# Patient Record
Sex: FEMALE | Race: WHITE | NOT HISPANIC OR LATINO | Employment: OTHER | URBAN - NONMETROPOLITAN AREA
[De-identification: names, ages, dates, MRNs, and addresses within clinical notes are randomized per-mention and may not be internally consistent; named-entity substitution may affect disease eponyms.]

---

## 2017-03-02 ENCOUNTER — HISTORY (OUTPATIENT)
Dept: RADIOLOGY | Facility: OTHER | Age: 62
End: 2017-03-02

## 2017-03-02 ENCOUNTER — HOSPITAL ENCOUNTER (OUTPATIENT)
Dept: RADIOLOGY | Facility: OTHER | Age: 62
End: 2017-03-02
Attending: FAMILY MEDICINE

## 2017-03-02 DIAGNOSIS — Z12.31 ENCOUNTER FOR SCREENING MAMMOGRAM FOR MALIGNANT NEOPLASM OF BREAST: ICD-10-CM

## 2017-07-12 ENCOUNTER — OFFICE VISIT (OUTPATIENT)
Dept: OTOLARYNGOLOGY | Facility: CLINIC | Age: 62
End: 2017-07-12
Payer: COMMERCIAL

## 2017-07-12 DIAGNOSIS — G51.9 FACIAL NERVE DISORDER: Primary | ICD-10-CM

## 2017-07-12 PROCEDURE — 64612 DESTROY NERVE FACE MUSCLE: CPT | Performed by: OTOLARYNGOLOGY

## 2017-07-12 NOTE — MR AVS SNAPSHOT
After Visit Summary   7/12/2017    Zakiya Hopkins    MRN: [id number]           Patient Information     Date Of Birth          1955        Visit Information        Provider Department      7/12/2017 1:30 PM Gilmer Metcalf MD [facility]        Today's Diagnoses     Facial nerve disorder    -  1      Care Instructions    Please contact our clinic if you have questions or if problems arise:    [place] office: [phone]  [facility] office: [phone]    If it is an urgent matter when the clinic is closed, please contact the [facility]  [phone] and ask to have Dr. Gilmer Metcalf, or the ENT resident on-call paged. If it is a serious matter requiring immediate attention, please call 911 or go to your nearest emergency department.            Follow-ups after your visit        Follow-up notes from your care team     Return if symptoms worsen or fail to improve.      Who to contact     If you have questions or need follow up information about today's clinic visit or your schedule please contact [facility] directly at [phone].  Normal or non-critical lab and imaging results will be communicated to you by MyChart, letter or phone within 4 business days after the clinic has received the results. If you do not hear from us within 7 days, please contact the clinic through Bramasolhart or phone. If you have a critical or abnormal lab result, we will notify you by phone as soon as possible.  Submit refill requests through 2CRisk or call your pharmacy and they will forward the refill request to us. Please allow 3 business days for your refill to be completed.          Additional Information About Your Visit        Bramasolhart Information     2CRisk is an electronic gateway that provides easy, online access to your medical records. With 2CRisk, you can request a clinic appointment, read your test results, renew a prescription or  communicate with your care team.     To sign up for ByteLighthart visit the website at [email or web address]   You will be asked to enter the access code listed below, as well as some personal information. Please follow the directions to create your username and password.     Your access code is: [email or web address]  Expires: 10/10/2017  1:44 PM     Your access code will  in 90 days. If you need help or a new code, please contact your [facility] or call [phone] for assistance.        Care EveryWhere ID     This is your Care EveryWhere ID. This could be used by other organizations to access your [place] medical records  [id number]         Blood Pressure from Last 3 Encounters:   12/10/15 129/74   10/29/15 127/56   13 126/66    Weight from Last 3 Encounters:   10/28/15 60.4 kg (133 lb 2.5 oz)   12 59 kg (130 lb)   12 59 kg (130 lb)              We Performed the Following     BOTOX 1-25 UNITS     CHEMODENERVATION, FACE NERVE MUSCLE        Primary Care Provider Office Phone # Fax #    Armani VERA Chao [phone]       [facility] [address] [facility] [address]        Equal Access to Services     ENEDINA JOHNS : Hadii aad ku hadasho Soomaali, waaxda luqadaha, qaybta kaalmada adeegyada, waxay idiin hayaan faby silva . So [facility] [phone].    ATENCIÓN: Si habla español, tiene a coughlin disposición servicios gratuitos de asistencia lingüística. Aricame al [phone].    We comply with applicable federal civil rights laws and [place] laws. We do not discriminate on the basis of race, color, national origin, age, disability sex, sexual orientation or gender identity.            Thank you!     Thank you for choosing [facility]  for your care. Our goal is always to provide you with excellent care. Hearing back from our patients is one way we can continue to improve our services. Please take a few minutes to  complete the written survey that you may receive in the mail after your visit with us. Thank you!             Your Updated Medication List - Protect others around you: Learn how to safely use, store and throw away your medicines at www.disposemymeds.org.          This list is accurate as of: 7/12/17  1:44 PM.  Always use your most recent med list.                   Brand Name Dispense Instructions for use Diagnosis    diphenhydrAMINE-acetaminophen  MG tablet    TYLENOL PM     Take 1 tablet by mouth nightly as needed for sleep        REFRESH P.M. OP      Apply  to eye.        TURMERIC PO           vitamin D 1000 UNITS capsule      Take 1 capsule by mouth daily

## 2017-07-12 NOTE — PATIENT INSTRUCTIONS
Please contact our clinic if you have questions or if problems arise:    [place] office: [phone]  [facility] office: [phone]    If it is an urgent matter when the clinic is closed, please contact the [facility]  [phone] and ask to have Dr. Gilmer Metcalf, or the ENT resident on-call paged. If it is a serious matter requiring immediate attention, please call 911 or go to your nearest emergency department.

## 2017-07-12 NOTE — PROGRESS NOTES
CLINICAL SUMMARY:   Diagnoses:    1) Facial Paralysis following vestibular schwannoma.  -7/30/12: Right middle fossa approach to excision of right acoustic neuroma with Jesús Altman and Lambert.   -8/20/12: Right upper eyelid platinum weight implant- 1.8g.   -10/29/15: removal of upper eyelid weight.    2) Right periorbital and chin synkinesis.  -Botox: 1/27/16, 6/29/16.  Comorbidities: None    Pertinent medications: None    Photographs: [facility] consents signed September 10, 2015.   Connection: N/A   Care Checklist:   _follow up phone call with the nursing staff in 2-3 weeks.      She reports that she does not notice the twitching of her right eye and requests no botox to that area at this time. The dragging spasm and pulling of her lower jawline has returned and is worse at the end of the day. This has been greatly helped in the past by the botox injections. . The synkinesis is associated with tightness. She has also noticed the chin dimpling has returned. I recommend botox treatment at our previous dosage without the periorbital component. No side effects from the last botox treatment. I offered injection to her platysma but she wanted to try the chin injections only since they have worked so well in the past.         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     Postoperative diagnosis: same.     Procedure:  Botox injection to the right chin, 15u total.  -Right chin 5u x 3     Botox informaton: Allergan Onabotulinumtoxin A 100u/vial. Lot#[id number], Expiration 12/2019. NDC 5148-5907-41     A preoperative discussion was held. The Ms. Hopkins stated she understood the risks, benefits, alternatives, and limitations of the procedure. The risks, including but not limited to, bleeding, discomfort at the injection site, headache, neck pain, dry mouth, tiredness, loss of strength in the body, hoarseness, trouble saying words, trouble breathing, trouble swallowing, eye  problems such as double vision, blurred vision, decreased eyesight, drooping eyelids, swelling of eyelids, dry eyes, and unforseen complications related to the procedure were discussed. I emphasized the possibility of worsening of her eye function given her history of facial paralysis and eye dryness. I also emphasized the temporary nature of the treatment and more treatment may be necessary in the future. She stated that her questions were answered to her satisfaction. She wished to undergo the procedure.     Procedure: After informed consent was obtained, the patient was prepped with an alcohol pad and marked. Reconstituted botox 100u with a 2mL dilution of unpreserved saline was used during the procedure.    15u total was injected at each of the following sites:    -Right chin 5u x 3.  Hemostasis was excellent. The patient tolerated the procedure well and was discharged home in stable condition. She was instructed to avoid rubbing the injection site for at least 36 hours, keep her head elevated for 6 hours, minimize facial movement, and to call if they have questions or if problems arise..

## 2017-07-12 NOTE — NURSING NOTE
"Zakiya Hopkins's goals for this visit include:   Chief Complaint   Patient presents with     Botox     botox, facial for synkinesis       She requests these members of her care team be copied on today's visit information: Armani Guidry      PCP: Armani Guidry    Referring Provider:  No referring provider defined for this encounter.    Chief Complaint   Patient presents with     Botox     botox, facial for synkinesis       Initial There were no vitals taken for this visit. Estimated body mass index is 21.49 kg/(m^2) as calculated from the following:    Height as of 10/28/15: 1.676 m (5' 6\").    Weight as of 10/28/15: 60.4 kg (133 lb 2.5 oz).  Medication Reconciliation: complete    Do you need any medication refills at today's visit? No    Chichi Ramirez RN    "

## 2017-11-20 ENCOUNTER — AMBULATORY - GICH (OUTPATIENT)
Dept: SCHEDULING | Facility: OTHER | Age: 62
End: 2017-11-20

## 2017-12-13 ENCOUNTER — HISTORY (OUTPATIENT)
Dept: FAMILY MEDICINE | Facility: OTHER | Age: 62
End: 2017-12-13

## 2017-12-13 ENCOUNTER — OFFICE VISIT - GICH (OUTPATIENT)
Dept: FAMILY MEDICINE | Facility: OTHER | Age: 62
End: 2017-12-13

## 2017-12-13 ENCOUNTER — AMBULATORY - GICH (OUTPATIENT)
Dept: FAMILY MEDICINE | Facility: OTHER | Age: 62
End: 2017-12-13

## 2017-12-13 ENCOUNTER — COMMUNICATION - GICH (OUTPATIENT)
Dept: FAMILY MEDICINE | Facility: OTHER | Age: 62
End: 2017-12-13

## 2017-12-13 DIAGNOSIS — G51.0 BELL'S PALSY: ICD-10-CM

## 2017-12-13 DIAGNOSIS — H81.4 VERTIGO OF CENTRAL ORIGIN: ICD-10-CM

## 2017-12-13 DIAGNOSIS — E78.5 HYPERLIPIDEMIA: ICD-10-CM

## 2017-12-13 DIAGNOSIS — M12.9 ARTHROPATHY: ICD-10-CM

## 2017-12-13 DIAGNOSIS — G47.00 INSOMNIA: ICD-10-CM

## 2017-12-13 DIAGNOSIS — D33.3 BENIGN NEOPLASM OF CRANIAL NERVE (H): ICD-10-CM

## 2017-12-13 LAB
A/G RATIO - HISTORICAL: 1.6 (ref 1–2)
ABSOLUTE BASOPHILS - HISTORICAL: 0 THOU/CU MM
ABSOLUTE EOSINOPHILS - HISTORICAL: 0.1 THOU/CU MM
ABSOLUTE IMMATURE GRANULOCYTES(METAS,MYELOS,PROS) - HISTORICAL: 0 THOU/CU MM
ABSOLUTE LYMPHOCYTES - HISTORICAL: 1.5 THOU/CU MM (ref 0.9–2.9)
ABSOLUTE MONOCYTES - HISTORICAL: 0.4 THOU/CU MM
ABSOLUTE NEUTROPHILS - HISTORICAL: 3.3 THOU/CU MM (ref 1.7–7)
ALBUMIN SERPL-MCNC: 4.4 G/DL (ref 3.5–5.7)
ALP SERPL-CCNC: 78 IU/L (ref 34–104)
ALT (SGPT) - HISTORICAL: 20 IU/L (ref 7–52)
ANION GAP - HISTORICAL: 9 (ref 5–18)
AST SERPL-CCNC: 20 IU/L (ref 13–39)
BASOPHILS # BLD AUTO: 0.6 %
BILIRUB SERPL-MCNC: 1.2 MG/DL (ref 0.3–1)
BUN SERPL-MCNC: 17 MG/DL (ref 7–25)
BUN/CREAT RATIO - HISTORICAL: 23
CALCIUM SERPL-MCNC: 9.5 MG/DL (ref 8.6–10.3)
CHLORIDE SERPLBLD-SCNC: 105 MMOL/L (ref 98–107)
CHOL/HDL RATIO - HISTORICAL: 2.72
CHOLESTEROL TOTAL: 223 MG/DL
CO2 SERPL-SCNC: 26 MMOL/L (ref 21–31)
CREAT SERPL-MCNC: 0.74 MG/DL (ref 0.7–1.3)
EOSINOPHIL NFR BLD AUTO: 1.3 %
ERYTHROCYTE [DISTWIDTH] IN BLOOD BY AUTOMATED COUNT: 12.7 % (ref 11.5–15.5)
GFR IF NOT AFRICAN AMERICAN - HISTORICAL: >60 ML/MIN/1.73M2
GLOBULIN - HISTORICAL: 2.7 G/DL (ref 2–3.7)
GLUCOSE SERPL-MCNC: 100 MG/DL (ref 70–105)
HCT VFR BLD AUTO: 44.5 % (ref 33–51)
HDLC SERPL-MCNC: 82 MG/DL (ref 23–92)
HEMOGLOBIN: 14.6 G/DL (ref 12–16)
IMMATURE GRANULOCYTES(METAS,MYELOS,PROS) - HISTORICAL: 0.2 %
LDLC SERPL CALC-MCNC: 128 MG/DL
LYMPHOCYTES NFR BLD AUTO: 27.9 % (ref 20–44)
MCH RBC QN AUTO: 29 PG (ref 26–34)
MCHC RBC AUTO-ENTMCNC: 32.8 G/DL (ref 32–36)
MCV RBC AUTO: 88 FL (ref 80–100)
MONOCYTES NFR BLD AUTO: 8.3 %
NEUTROPHILS NFR BLD AUTO: 61.7 % (ref 42–72)
NON-HDL CHOLESTEROL - HISTORICAL: 141 MG/DL
PLATELET # BLD AUTO: 238 THOU/CU MM (ref 140–440)
PMV BLD: 10.2 FL (ref 6.5–11)
POTASSIUM SERPL-SCNC: 4.2 MMOL/L (ref 3.5–5.1)
PROT SERPL-MCNC: 7.1 G/DL (ref 6.4–8.9)
PROVIDER ORDERDED STATUS - HISTORICAL: ABNORMAL
RED BLOOD COUNT - HISTORICAL: 5.04 MIL/CU MM (ref 4–5.2)
SODIUM SERPL-SCNC: 140 MMOL/L (ref 133–143)
TRIGL SERPL-MCNC: 65 MG/DL
WHITE BLOOD COUNT - HISTORICAL: 5.3 THOU/CU MM (ref 4.5–11)

## 2017-12-17 ENCOUNTER — HEALTH MAINTENANCE LETTER (OUTPATIENT)
Age: 62
End: 2017-12-17

## 2018-01-03 NOTE — PROGRESS NOTES
Patient Information     Patient Name MRN Sex Zakiya Goldberg [id number] Female 1955      Progress Notes by Ann Jeffries R.T. (ARRT) at 3/2/2017 10:28 AM     Author:  Ann Jeffries R.T. (ARRT) Service:  (none) Author Type:  (none)     Filed:  3/2/2017 10:28 AM Date of Service:  3/2/2017 10:28 AM Status:  Signed     :  Ann Jeffries R.T. (SPENCERT) ([facility] - Registered Radiologic Technologist)            Falls Risk Criteria:    Age 65 and older or under age 4        Sensory deficits    Poor vision    Use of ambulatory aides    Impaired judgment    Unable to walk independently    Meets High Risk criteria for falls:  no

## 2018-01-26 ENCOUNTER — DOCUMENTATION ONLY (OUTPATIENT)
Dept: FAMILY MEDICINE | Facility: OTHER | Age: 63
End: 2018-01-26

## 2018-01-26 PROBLEM — G57.60 LESION OF PLANTAR NERVE: Status: ACTIVE | Noted: 2018-01-26

## 2018-01-26 RX ORDER — ZOLPIDEM TARTRATE 10 MG/1
10 TABLET ORAL
COMMUNITY
Start: 2017-12-13 | End: 2018-07-02

## 2018-01-26 RX ORDER — ZOLPIDEM TARTRATE 5 MG/1
1 TABLET ORAL AT BEDTIME
COMMUNITY
End: 2019-02-12 | Stop reason: DRUGHIGH

## 2018-01-26 RX ORDER — OXYCODONE HYDROCHLORIDE 5 MG/1
1 CAPSULE ORAL EVERY 6 HOURS
COMMUNITY
End: 2019-02-12

## 2018-01-26 RX ORDER — CEPHALEXIN 500 MG/1
1 CAPSULE ORAL 4 TIMES DAILY
COMMUNITY
Start: 2012-08-20 | End: 2018-03-06

## 2018-01-26 RX ORDER — TURMERIC 100 %
POWDER (GRAM) MISCELLANEOUS
COMMUNITY
End: 2019-02-12

## 2018-01-26 RX ORDER — ALCONOX
1 POWDER (GRAM) MISCELLANEOUS DAILY
COMMUNITY
End: 2019-05-20

## 2018-01-26 RX ORDER — ERYTHROMYCIN 5 MG/G
OINTMENT OPHTHALMIC
COMMUNITY
Start: 2012-08-20 | End: 2019-02-12

## 2018-02-09 VITALS
DIASTOLIC BLOOD PRESSURE: 88 MMHG | BODY MASS INDEX: 21.21 KG/M2 | SYSTOLIC BLOOD PRESSURE: 146 MMHG | HEART RATE: 68 BPM | HEIGHT: 66 IN | WEIGHT: 132 LBS

## 2018-02-10 ASSESSMENT — PATIENT HEALTH QUESTIONNAIRE - PHQ9: SUM OF ALL RESPONSES TO PHQ QUESTIONS 1-9: 3

## 2018-02-12 NOTE — PROGRESS NOTES
Patient Information     Patient Name MRN Sex Zakiya Goldberg [id number] Female 1955      Progress Notes by Armani Guidry MD at 2017  8:15 AM     Author:  Armani Guidry MD Service:  (none) Author Type:  Physician     Filed:  2017  2:21 PM Encounter Date:  2017 Status:  Signed     :  Armani Guidry MD (Physician)            There are no exam notes on file for this visit.    SUBJECTIVE:  Zakiya Hopkins  is a 62 y.o. female who comes in today for complete evaluation.    She didn't tolerate Remeron. Ambien works    She recently did FIT test through her insurance which was negative. Colonoscopy last done in . She last had lipids done 5 years ago. She is up-to-date on immunizations but has not yet had her flu shot.    She has had facial synkinesis after facial paralysis from her surgery and has had Botox in her chin, but didn't have the periorbital area done. She has no pain, but feels like her right face feels tight and pulling. She at times will get some twitching. She will at times get clear fluid out of her nose when she bends over. It has a funny taste/smell. She has no systemic symptoms.     Neymar had prostatectomy and was doing well, but fell and tore his rotator cuff and had a repair and broke his toe so he is miserable.     She has been going to the gym 3 times per week.  They are enjoying living in her new home. She is still antiquing.      Past Medical, Family, and Social History reviewed and updated as noted below.   ROS is negative except as noted above       Allergies      Allergen   Reactions     Demerol [Meperidine]  Vomiting     Dilaudid [Hydromorphone]  Vomiting     Erythromycin  Edema     ointment    ,   Family History       Problem   Relation Age of Onset     Other  Mother      Chemical dependency/Alzheimers       Cancer-breast  Sister      Other  Sister      Bipolar       Hypertension  Father      Diabetes  Father      Other  Father      Chemical  "depedency       Stroke  Father      Heart Disease  Father      angioplasty       Cancer-breast  Maternal Grandmother      Heart Disease  Maternal Grandmother      CAD       Alcohol/Drug  Brother      Alcoholic     ,   Current Outpatient Prescriptions on File Prior to Visit       Medication  Sig Dispense Refill     cholecalciferol (VITAMIN D) 1,000 unit capsule Take 2 capsules daily  0     diphenhydrAMINE-acetaminophen  mg (ACETAMINOPHEN PM)  mg tablet Take 1 tablet by mouth at bedtime. Max acetaminophen dose: 4000mg in 24 hrs.  0     No current facility-administered medications on file prior to visit.    ,   Past Medical History:     Diagnosis  Date     Facial nerve palsy, secondary 8/12    Postoperative complication after acoustic neuroma resection      Menarche age 11     Tendinitis of left rotator cuff      Tortuous colon 04/05   ,   Patient Active Problem List      Diagnosis Date Noted     Insomnia 10/20/2015     Vertigo of central origin 11/03/2012     Facial nerve palsy, secondary 11/01/2012     C6 radiculopathy 11/01/2012     ACOUSTIC NEUROMA 06/28/2012     ARTHRITIS 10/04/2011     BADILLO'S NEUROMA, RIGHT    ,   Past Surgical History:      Procedure  Laterality Date     Acoustic neuroma resection  8/12    [facility]       COLONOSCOPY  04/05     [facility]      and   Social History       Substance Use Topics         Smoking status:   Never Smoker     Smokeless tobacco:   Never Used     Alcohol use   Yes      Comment: rare      OBJECTIVE:  /88 (Cuff Size: Adult Regular)  Pulse 68  Ht 1.664 m (5' 5.5\")  Wt 59.9 kg (132 lb)  Breastfeeding? No  BMI 21.63 kg/m2   EXAM:  General Appearance: Pleasant, alert, appropriate appearance for age. No acute distress  Head Exam: Normal. Normocephalic, atraumatic.  Eye Exam: PERRLA, EOMI, conjunctiva, sclerae normal.  Ear Exam: Normal TM's bilaterally. Normal auditory canals and external ears. Non-tender. We curetted some wax out of her right " ear canal.  Nose Exam: Normal external nose, mucus membranes, and septum.  OroPharynx Exam:  Dental hygiene adequate. Normal buccal mucose. Normal pharynx.  Neck Exam:  Supple, no masses or nodes. No bruits  Thyroid Exam: No nodules or enlargement.  Chest/Respiratory Exam: Normal chest wall and respirations. Clear to auscultation.  Breast Exam: No dimpling, nipple retraction or discharge. No masses or nodes.  Cardiovascular Exam: Regular rate and rhythm. S1, S2, no murmur, click, gallop, or rubs.  Gastrointestinal Exam: Soft, non-tender, no masses or organomegaly.  Lymphatic Exam: Non-palpable nodes in neck, clavicular, axillary, or inguinal regions.  Musculoskeletal Exam: Back is straight and non-tender, full ROM of upper and lower extremities.  Foot Exam: Left and right foot: good pedal pulses  Skin: no rash or abnormalities  Neurologic Exam:  normal gross motor, tone coordination and no tremor.  Psychiatric Exam: Alert and oriented - appropriate affect.     Results for orders placed or performed in visit on 12/13/17      COMP METABOLIC PANEL      Result  Value Ref Range    SODIUM 140 133 - 143 mmol/L    POTASSIUM 4.2 3.5 - 5.1 mmol/L    CHLORIDE 105 98 - 107 mmol/L    CO2,TOTAL 26 21 - 31 mmol/L    ANION GAP 9 5 - 18                    GLUCOSE 100 70 - 105 mg/dL    CALCIUM 9.5 8.6 - 10.3 mg/dL    BUN 17 7 - 25 mg/dL    CREATININE 0.74 0.70 - 1.30 mg/dL    BUN/CREAT RATIO           23                    GFR if African American >60 >60 ml/min/1.73m2    GFR if not African American >60 >60 ml/min/1.73m2    ALBUMIN 4.4 3.5 - 5.7 g/dL    PROTEIN,TOTAL 7.1 6.4 - 8.9 g/dL    GLOBULIN                  2.7 2.0 - 3.7 g/dL    A/G RATIO 1.6 1.0 - 2.0                    BILIRUBIN,TOTAL 1.2 (H) 0.3 - 1.0 mg/dL    ALK PHOSPHATASE 78 34 - 104 IU/L    ALT (SGPT) 20 7 - 52 IU/L    AST (SGOT) 20 13 - 39 IU/L   LIPID PANEL      Result  Value Ref Range    CHOLESTEROL,TOTAL 223 (H) <200 mg/dL    TRIGLYCERIDES 65 <150 mg/dL    HDL  CHOLESTEROL 82 23 - 92 mg/dL    NON-HDL CHOLESTEROL 141 <145 mg/dl    CHOL/HDL RATIO            2.72 <4.50                    LDL CHOLESTEROL 128 (H) <100 mg/dL    PROVIDER ORDERED STATUS RANDOM    CBC WITH AUTO DIFFERENTIAL      Result  Value Ref Range    WHITE BLOOD COUNT         5.3 4.5 - 11.0 thou/cu mm    RED BLOOD COUNT           5.04 4.00 - 5.20 mil/cu mm    HEMOGLOBIN                14.6 12.0 - 16.0 g/dL    HEMATOCRIT                44.5 33.0 - 51.0 %    MCV                       88 80 - 100 fL    MCH                       29.0 26.0 - 34.0 pg    MCHC                      32.8 32.0 - 36.0 g/dL    RDW                       12.7 11.5 - 15.5 %    PLATELET COUNT            238 140 - 440 thou/cu mm    MPV                       10.2 6.5 - 11.0 fL    NEUTROPHILS               61.7 42.0 - 72.0 %    LYMPHOCYTES               27.9 20.0 - 44.0 %    MONOCYTES                 8.3 <12.0 %    EOSINOPHILS               1.3 <8.0 %    BASOPHILS                 0.6 <3.0 %    IMMATURE GRANULOCYTES(METAS,MYELOS,PROS) 0.2 %    ABSOLUTE NEUTROPHILS      3.3 1.7 - 7.0 thou/cu mm    ABSOLUTE LYMPHOCYTES      1.5 0.9 - 2.9 thou/cu mm    ABSOLUTE MONOCYTES        0.4 <0.9 thou/cu mm    ABSOLUTE EOSINOPHILS      0.1 <0.5 thou/cu mm    ABSOLUTE BASOPHILS        0.0 <0.3 thou/cu mm    ABSOLUTE IMMATURE GRANULOCYTES(METAS,MYELOS,PROS) 0.0 <=0.3 thou/cu mm      ASSESSMENT/Plan :      Zakiya was seen today for physical.    Diagnoses and all orders for this visit:    Facial nerve palsy, secondary  -     CBC AND DIFFERENTIAL; Future  -     CBC AND DIFFERENTIAL  -     CBC WITH AUTO DIFFERENTIAL    ACOUSTIC NEUROMA  -     CBC AND DIFFERENTIAL; Future  -     COMP METABOLIC PANEL; Future  -     CBC AND DIFFERENTIAL  -     COMP METABOLIC PANEL  -     CBC WITH AUTO DIFFERENTIAL    ARTHRITIS  -     CBC AND DIFFERENTIAL; Future  -     COMP METABOLIC PANEL; Future  -     CBC AND DIFFERENTIAL  -     COMP METABOLIC PANEL  -     CBC WITH AUTO  DIFFERENTIAL    Vertigo of central origin, unspecified laterality    Dyslipidemia  -     LIPID PANEL; Future  -     LIPID PANEL    Insomnia, unspecified type  -     zolpidem (AMBIEN) 10 mg tablet; Take 1 tablet by mouth at bedtime if needed for Sleep.     reviewed labs with her. Continue with healthy lifestyle practices that she has been. Renewed Ambien for insomnia which she takes occasionally. When she goes back to have another Botox shot whenever that may be, encouraged her to ask the ENT about her nasal discharge and symptoms when she bends over. Unlikely that this is a CSF leak and certainly she is not having any symptoms. We'll to this but would be worthwhile asking someone from a surgical perspective if there is anything further that needs to be done.      Armani Guidry MD

## 2018-03-06 ENCOUNTER — OFFICE VISIT (OUTPATIENT)
Dept: FAMILY MEDICINE | Facility: OTHER | Age: 63
End: 2018-03-06
Attending: FAMILY MEDICINE
Payer: MEDICARE

## 2018-03-06 ENCOUNTER — HOSPITAL ENCOUNTER (OUTPATIENT)
Dept: MAMMOGRAPHY | Facility: OTHER | Age: 63
Discharge: HOME | End: 2018-03-06
Attending: FAMILY MEDICINE | Admitting: FAMILY MEDICINE
Payer: MEDICARE

## 2018-03-06 VITALS
BODY MASS INDEX: 21.09 KG/M2 | DIASTOLIC BLOOD PRESSURE: 84 MMHG | WEIGHT: 128.69 LBS | SYSTOLIC BLOOD PRESSURE: 136 MMHG | HEART RATE: 60 BPM

## 2018-03-06 DIAGNOSIS — M67.40 GANGLION CYST: ICD-10-CM

## 2018-03-06 DIAGNOSIS — S16.1XXA STRAIN OF NECK MUSCLE, INITIAL ENCOUNTER: ICD-10-CM

## 2018-03-06 DIAGNOSIS — L57.0 AK (ACTINIC KERATOSIS): ICD-10-CM

## 2018-03-06 DIAGNOSIS — G51.0 FACIAL NERVE PALSY, SECONDARY: ICD-10-CM

## 2018-03-06 DIAGNOSIS — Z12.31 VISIT FOR SCREENING MAMMOGRAM: ICD-10-CM

## 2018-03-06 DIAGNOSIS — H81.10 BENIGN PAROXYSMAL POSITIONAL VERTIGO, UNSPECIFIED LATERALITY: Primary | ICD-10-CM

## 2018-03-06 DIAGNOSIS — D33.3 ACOUSTIC NEUROMA (H): ICD-10-CM

## 2018-03-06 PROCEDURE — 77067 SCR MAMMO BI INCL CAD: CPT

## 2018-03-06 PROCEDURE — 99213 OFFICE O/P EST LOW 20 MIN: CPT | Mod: 25 | Performed by: FAMILY MEDICINE

## 2018-03-06 PROCEDURE — G0463 HOSPITAL OUTPT CLINIC VISIT: HCPCS

## 2018-03-06 PROCEDURE — G0463 HOSPITAL OUTPT CLINIC VISIT: HCPCS | Mod: 25

## 2018-03-06 PROCEDURE — 17003 DESTRUCT PREMALG LES 2-14: CPT | Performed by: FAMILY MEDICINE

## 2018-03-06 PROCEDURE — 17000 DESTRUCT PREMALG LESION: CPT | Performed by: FAMILY MEDICINE

## 2018-03-06 ASSESSMENT — PAIN SCALES - GENERAL: PAINLEVEL: NO PAIN (0)

## 2018-03-06 NOTE — MR AVS SNAPSHOT
After Visit Summary   3/6/2018    Zakiya Hopkins    MRN: [id number]           Patient Information     Date Of Birth          1955        Visit Information        Provider Department      3/6/2018 11:30 AM Armani Guidry MD [facility]        Today's Diagnoses     Benign paroxysmal positional vertigo, unspecified laterality    -  1    Acoustic neuroma, history of        Strain of neck muscle, initial encounter        Ganglion cyst, right thumb        AK (actinic keratosis)        Facial nerve palsy, secondary           Follow-ups after your visit        Additional Services     PHYSICAL THERAPY REFERRAL       Vestibular therapy                  Your next 10 appointments already scheduled     Mar 08, 2018  3:15 PM CST   Evaluation with Estefany Ford PT   [facility] ([facility])    [address] [place] [id number] [phone]            Mar 21, 2018  1:00 PM CDT   Return Visit with Gilmer Metcalf MD   [facility] ([facility]    [address] [place] [id number] [phone]              Who to contact     If you have questions or need follow up information about today's clinic visit or your schedule please contact [facility] directly at [phone].  Normal or non-critical lab and imaging results will be communicated to you by MyChart, letter or phone within 4 business days after the clinic has received the results. If you do not hear from us within 7 days, please contact the clinic through MyChart or phone. If you have a critical or abnormal lab result, we will notify you by phone as soon as possible.  Submit refill requests through [employer] or call your pharmacy and they will forward the refill request to us. Please allow 3 business days for your refill to be completed.          Additional Information About Your Visit        MyChart  "Information     [employer] lets you send messages to your doctor, view your test results, renew your prescriptions, schedule appointments and more. To sign up, go to [email or web address] . Click on \"Log in\" on the left side of the screen, which will take you to the Welcome page. Then click on \"Sign up Now\" on the right side of the page.     You will be asked to enter the access code listed below, as well as some personal information. Please follow the directions to create your username and password.     Your access code is: [email or web address]  Expires: 2018  5:48 PM     Your access code will  in 90 days. If you need help or a new code, please call your [place] clinic or [phone].        Care EveryWhere ID     This is your Care EveryWhere ID. This could be used by other organizations to access your [place] medical records  [id number]        Your Vitals Were     Pulse Breastfeeding? BMI (Body Mass Index)             60 No 21.09 kg/m2          Blood Pressure from Last 3 Encounters:   18 136/84   17 146/88   10/10/16 122/74    Weight from Last 3 Encounters:   18 128 lb 11 oz (58.4 kg)   17 132 lb (59.9 kg)   10/10/16 132 lb 12.8 oz (60.2 kg)              We Performed the Following     DESTRUCT PREMALIGNANT LESION, 2-14     DESTRUCT PREMALIGNANT LESION, FIRST     PHYSICAL THERAPY REFERRAL          Today's Medication Changes          These changes are accurate as of 3/6/18  5:48 PM.  If you have any questions, ask your nurse or doctor.               Start taking these medicines.        Dose/Directions    tiZANidine 4 MG tablet   Commonly known as:  ZANAFLEX   Used for:  Strain of neck muscle, initial encounter        Dose:  4 mg   Take 1 tablet (4 mg) by mouth 3 times daily as needed for muscle spasms   Quantity:  30 tablet   Refills:  1            Where to get your medicines      These medications were sent to [facility] [address] [place] [address] [facility]" [address], [facility] [address]     Phone:  [phone]     tiZANidine 4 MG tablet                Primary Care Provider Office Phone # Fax #    Armani VERA MD Chao [phone]       [address] [facility] [address]        Equal Access to Services     ENEDINA JOHNS : Hadii aad ku hadasho Soomaali, waaxda luqadaha, qaybta kaalmada adeegyada, waxay ryleyin haysamanthan adenils jamarisai rodrigues. So [facility] [phone].    ATENCIÓN: Si habla español, tiene a coughlin disposición servicios gratuitos de asistencia lingüística. [facility] [phone].    We comply with applicable federal civil rights laws and [place] laws. We do not discriminate on the basis of race, color, national origin, age, disability, sex, sexual orientation, or gender identity.            Thank you!     Thank you for choosing [facility] [place]  for your care. Our goal is always to provide you with excellent care. Hearing back from our patients is one way we can continue to improve our services. Please take a few minutes to complete the written survey that you may receive in the mail after your visit with us. Thank you!             Your Updated Medication List - Protect others around you: Learn how to safely use, store and throw away your medicines at www.disposemymeds.org.          This list is accurate as of 3/6/18  5:48 PM.  Always use your most recent med list.                   Brand Name Dispense Instructions for use Diagnosis    * AMBIEN 5 MG tablet   Generic drug:  zolpidem      Take 1 tablet by mouth At Bedtime        * zolpidem 10 MG tablet    AMBIEN     Take 10 mg by mouth nightly as needed for sleep        Cyanocobalamin Alisia      Take 1 tablet by mouth daily        diphenhydrAMINE-acetaminophen  MG tablet    TYLENOL PM     Take 1 tablet by mouth At Bedtime Max acetaminophen dose: 4000mg in 24 hrs.        erythromycin ophthalmic ointment    ROMYCIN     As instructed per provider. For incisions near  the eyes, apply ointment to all incisions or open wounds every 2 hours while awake. Your incisions and wounds should be covered at all times with ointment to promote healing.        oxyCODONE 5 MG capsule    OXY-IR     Take 1 capsule by mouth every 6 hours        PREMARIN 0.625 MG tablet   Generic drug:  estrogens (conjugated)      Take 1 tablet by mouth daily        REFRESH P.M. OP      Apply  to eye.        tiZANidine 4 MG tablet    ZANAFLEX    30 tablet    Take 1 tablet (4 mg) by mouth 3 times daily as needed for muscle spasms    Strain of neck muscle, initial encounter       Turmeric Powd           * vitamin D 1000 UNITS capsule      Take 1 capsule by mouth daily        * vitamin D3 1000 UNITS Caps      Take 2 capsules by mouth daily        * Notice:  This list has 4 medication(s) that are the same as other medications prescribed for you. Read the directions carefully, and ask your doctor or other care provider to review them with you.

## 2018-03-06 NOTE — PROGRESS NOTES
There are no exam notes on file for this visit.    SUBJECTIVE:  Zakiya Hopkins  is a 62 year old female who comes in today because of issues with vertigo.  She is status post removal of an acoustic neuroma at the [facility] in 2012.    She tripped over her vacuum  hose about a month ago.  She fell on her right sided but didn't hit her head. Since them, she has had a little hand numbness and clumsiness. She is dropping more things.  She has had more vertigo. She is getting car sick again. Worse when she leans forward.  Her right face is numb and twitchy and she is due for Botox again. She gets nausea with this. She had to stop going to the gym.     She has some spots she would like to have treated on her skin.     Past Medical, Family, and Social History reviewed and updated as noted below.   ROS is negative except as noted above       Allergies   Allergen Reactions     Hydromorphone Nausea and Nausea and Vomiting     ha     Oxycodone Nausea     ha     Erythromycin Swelling     Other reaction(s): Edema  ointment  Ophthalmic route     Meperidine Nausea and Vomiting   ,   Family History   Problem Relation Age of Onset     Other - See Comments Mother      Chemical dependency/Alzheimers     Breast Cancer Sister      Cancer-breast     Other - See Comments Sister      Bipolar     Hypertension Father      Hypertension     DIABETES Father      Diabetes     Other - See Comments Father      Chemical depedency     Other - See Comments Father      Stroke     HEART DISEASE Father      Heart Disease,angioplasty     Breast Cancer Maternal Grandmother      Cancer-breast     HEART DISEASE Maternal Grandmother      Heart Disease,CAD     Substance Abuse Brother      Alcohol/Drug,Alcoholic   ,   Current Outpatient Prescriptions   Medication     tiZANidine (ZANAFLEX) 4 MG tablet     Cyanocobalamin GEMINI     erythromycin (ROMYCIN) ophthalmic ointment     estrogens, conjugated, (PREMARIN) 0.625 MG tablet     oxyCODONE  (OXY-IR) 5 MG capsule     zolpidem (AMBIEN) 5 MG tablet     Turmeric POWD     zolpidem (AMBIEN) 10 MG tablet     Cholecalciferol (VITAMIN D3) 1000 UNITS CAPS     diphenhydrAMINE-acetaminophen (TYLENOL PM)  MG tablet     Cholecalciferol (VITAMIN D) 1000 UNITS capsule     Artificial Tear Ointment (REFRESH P.M. OP)     No current facility-administered medications for this visit.    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     Personal history of other medical treatment (CODE)     age 11   ,   Patient Active Problem List    Diagnosis Date Noted     Lesion of plantar nerve 01/26/2018     Priority: Medium     Insomnia 10/20/2015     Priority: Medium     Facial nerve palsy, secondary 09/10/2015     Priority: Medium     Vertigo of central origin 11/03/2012     Priority: Medium     C6 radiculopathy 11/01/2012     Priority: Medium     Acoustic neuroma (H) 07/17/2012     Priority: Medium     Arthropathy 10/04/2011     Priority: Medium   ,   Past Surgical History:   Procedure Laterality Date     COLONOSCOPY      04/05     HYSTERECTOMY TOTAL ABDOMINAL, BILATERAL SALPINGO-OOPHORECTOMY, COMBINED      No Comments Provided     OTHER SURGICAL HISTORY      8/12,[id number],OTHER,[facility]    and   Social History   Substance Use Topics     Smoking status: Never Smoker     Smokeless tobacco: Never Used     Alcohol use Yes      Comment: Alcoholic Drinks/day: rare     OBJECTIVE:  /84 (BP Location: Right arm, Patient Position: Sitting)  Pulse 60  Wt 128 lb 11 oz (58.4 kg)  Breastfeeding? No  BMI 21.09 kg/m2   EXAM:  Alert cooperative, no distress.  Some tingling and slight drooping of the right side of the face which is chronic is unchanged.  TMs appear clear bilaterally.  Throat is clear.  Mucous  membranes are moist.  Neck is supple without significant adenopathy.  Neck range of motion is preserved.  She has tightness across the trapezius and neck strap muscles.  She has no loss of strength or reflexes in her upper extremities although some clumsiness is noted of her right hand which is chronic.  She has a ganglion cyst on her right thumb on the extensor tendon which is nontender and mobile.  Lungs are clear, no rales rhonchi or wheezes are heard.  Cardiac RRR without murmur.  She has an actinic keratosis on her left upper arm left inner knee and right thigh all of which are frozen serially ×2 with liquid nitrogen.  ASSESSMENT/Plan :    Zakiya was seen today for dizziness.    Diagnoses and all orders for this visit:    Benign paroxysmal positional vertigo, unspecified laterality  -     PHYSICAL THERAPY REFERRAL    Acoustic neuroma, history of    Strain of neck muscle, initial encounter  -     tiZANidine (ZANAFLEX) 4 MG tablet; Take 1 tablet (4 mg) by mouth 3 times daily as needed for muscle spasms    Ganglion cyst, right thumb    AK (actinic keratosis)  -     DESTRUCT PREMALIGNANT LESION, FIRST  -     DESTRUCT PREMALIGNANT LESION, 2-14    Facial nerve palsy, secondary    It sounds as if her vertigo is probably positional because it happens when she leans forward and not when she is lying down.  This seems different than the centrally mediated vertigo that she had from her acoustic neuroma.  After discussion we elected to send her to physical therapy for canalith repositioning maneuvers to see if that would be beneficial.    It is likely that when she fell she probably strained her neck as well and has more radicular symptoms in her right arm.  We will try some tizanidine at night to see if that will help with muscle relaxation and symptom improvement.  Could consider steroid but elected to hold off at this time.    Advised regarding the pain and vesiculation reaction that could be expected from cyrotherpy.        Armani Guidry MD

## 2018-03-08 ENCOUNTER — HOSPITAL ENCOUNTER (OUTPATIENT)
Dept: PHYSICAL THERAPY | Facility: OTHER | Age: 63
Setting detail: THERAPIES SERIES
End: 2018-03-08
Attending: FAMILY MEDICINE
Payer: MEDICARE

## 2018-03-08 PROCEDURE — G8979 MOBILITY GOAL STATUS: HCPCS | Mod: GP,CI

## 2018-03-08 PROCEDURE — G8978 MOBILITY CURRENT STATUS: HCPCS | Mod: GP,CK

## 2018-03-08 PROCEDURE — 40000185 ZZHC STATISTIC PT OUTPT VISIT

## 2018-03-08 PROCEDURE — 97162 PT EVAL MOD COMPLEX 30 MIN: CPT | Mod: GP

## 2018-03-08 PROCEDURE — 97112 NEUROMUSCULAR REEDUCATION: CPT | Mod: GP,XU

## 2018-03-08 PROCEDURE — 95992 CANALITH REPOSITIONING PROC: CPT | Mod: GP

## 2018-03-08 NOTE — PROGRESS NOTES
03/08/18 1500   Quick Adds   Quick Adds Vestibular Eval;Certification   Type of Visit Initial OP PT Evaluation   General Information   Start of Care Date 03/08/18   Referring Physician Dr. Guidry   Orders Evaluate and Treat as Indicated   Additional Orders vestibular rehab   Order Date 03/06/18   Medical Diagnosis BPPV   Precautions/Limitations no known precautions/limitations   Surgical/Medical history reviewed Yes   Pertinent history of current problem (include personal factors and/or comorbidities that impact the POC) Sindi comes in with 3 1/2 week history of vertigo. She's had positional vertigo years ago, then in 2012 had an acustic neuroma removed and has had residual off balance and slight nausea feeling.  This is more postional with bending over (housework, etc), started about 3 1/2 weeks ago when she tripped on vacuum didn't hit head, she does have disc issues and she's had some numbness R hand/fingertips and clumbsy. Tight in neck and noisey.  She had been doing free weights, treadmill and some machines but hasn't been back since fall.  M relaxer for neck and she felt even worse for side effects so she quit taking them.  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 Had car sickness from the time she was a kid until she had her neuroma surgery, came back in last 3 weeks.  Ice 20 min helps neck siffness, not doing daily.  R ear feels muddy.  She's lost weight because of decreased appetite with nausea. She feels that her facial spasms have been worse also.    Pertinent Visual History  R side of the face has muscle spasms since surgery, she gets botox 1-3 x/yr to help with spasms, scheduled for 3/20/18.  She haw a weight in eyelid to close.     Prior level of function comment Independent, manged residual sx well   Previous/Current Treatment Physical Therapy   Improvement after PT Significant   Patient role/Employment history Disabled   Living environment [place]   Fall Risk Screen   Have you  fallen 2 or more times in the past year? No   Have you fallen and had an injury in the past year? No   Is patient a fall risk? No   Pain   Patient currently in pain Yes   Pain location neck/ R upper trap   Pain rating 2/10   Cognitive Status Examination   Orientation orientation to person, place and time   Posture   Posture Normal   Gait   Gait Comments No AD but does have path deviation   Balance   Balance Comments eyes closed lost balance immediately   Balance Quick Add Systems impairment contributing to balance disturbance   Balance Special Tests   Balance Special Tests Single leg stance right;Single leg stance left   Balance Special Tests Single Leg Stance Right,   Right, seconds 20 Seconds   Balance Special Tests Single Leg Stance Left   Left, seconds 20 Seconds   Cervicogenic Screen   Neck ROM stiff at end range but WFL   Oculomotor Exam   Smooth Pursuit Normal   Saccades Normal   Convergence Testing Normal   Infrared Goggle Exam or Frenzel Lense Exam   Vestibular Suppressant in Last 24 Hours? No   Exam completed with Room Light   Spontaneous Nystagmus Negative   Gaze Evoked Nystagmus Negative   Sarbjit-Hallpike (right) Other  (mild sx, not visable nystagmus)   Sarbjit-Hallpike (Left) Negative   Planned Therapy Interventions   Planned Therapy Interventions neuromuscular re-education;balance training;strengthening;stretching;other (see comments)   Planned Therapy Interventions Comment vestibular retraining   Clinical Impression   Criteria for Skilled Therapeutic Interventions Met yes, treatment indicated   PT Diagnosis R BPPV and vestibular impairment   Influenced by the following impairments acustic neuroma surgery with residual deficits   Functional limitations due to impairments decreased balance and bending activity   Clinical Presentation Evolving/Changing   Clinical Decision Making (Complexity) Moderate complexity   Therapy Frequency other (see comments)   Predicted Duration of Therapy Intervention (days/wks) 1-2  times per week up to 8 weeks   Risk & Benefits of therapy have been explained Yes   Patient, Family & other staff in agreement with plan of care Yes   Education Assessment   Barriers to Learning No barriers   GOALS   PT Eval Goals 1;2;3;4   Goal 1   Goal Identifier neck   Goal Description Patient comfortable cervical ROM for driving in 4 weeks   Target Date 04/05/18   Goal 2   Goal Identifier nausea   Goal Description Patient have decreased nausea to feel comforable cooking meals and eating in 4 weeks.    Target Date 04/05/18   Goal 3   Goal Identifier bending   Goal Description Pt able to bend and  things from the floor without vertigo sx in 8 weeks.    Target Date 05/03/18   Goal 4   Goal Identifier HEP   Goal Description Pt able to manage any residiual vestibular sx with HEP in 8 weeks.    Target Date 05/03/18   Total Evaluation Time   Total Evaluation Time (Minutes) 30   Therapy Certification   Certification date from 03/08/18   Certification date to 05/03/18   Medical Diagnosis BPPV

## 2018-03-08 NOTE — PROGRESS NOTES
[facility]        OUTPATIENT PHYSICAL THERAPY FUNCTIONAL EVALUATION  PLAN OF TREATMENT FOR OUTPATIENT REHABILITATION  (COMPLETE FOR INITIAL CLAIMS ONLY)  Patient's Last Name, First Name, M.I.  YOB: 1955  Zakiya Hopkins     Provider's Name   [facility]   Medical Record No.  [id number]     Start of Care Date:  03/08/18   Onset Date:      Type:     _X__PT   ____OT  ____SLP Medical Diagnosis:  BPPV     PT Diagnosis:  R BPPV and vestibular impairment Visits from SOC:  1                              __________________________________________________________________________________  Plan of Treatment/Functional Goals:  neuromuscular re-education, balance training, strengthening, stretching, other (see comments)  vestibular retraining        GOALS  neck  Patient comfortable cervical ROM for driving in 4 weeks  04/05/18    nausea  Patient have decreased nausea to feel comforable cooking meals and eating in 4 weeks.   04/05/18    bending  Pt able to bend and  things from the floor without vertigo sx in 8 weeks.   05/03/18    HEP  Pt able to manage any residiual vestibular sx with HEP in 8 weeks.   05/03/18       Therapy Frequency:  other (see comments)   Predicted Duration of Therapy Intervention:  1-2 times per week up to 8 weeks    Estefany Ford, PT                                    I CERTIFY THE NEED FOR THESE SERVICES FURNISHED UNDER        THIS PLAN OF TREATMENT AND WHILE UNDER MY CARE     (Physician co-signature of this document indicates review and certification of the therapy plan).                Certification Date From:  03/08/18   Certification Date To:  05/03/18    Referring Provider:  Dr. Guidry    Initial Assessment  See Epic Evaluation- Start of Care Date: 03/08/18

## 2018-03-12 ENCOUNTER — HOSPITAL ENCOUNTER (OUTPATIENT)
Dept: PHYSICAL THERAPY | Facility: OTHER | Age: 63
Setting detail: THERAPIES SERIES
End: 2018-03-12
Attending: FAMILY MEDICINE
Payer: MEDICARE

## 2018-03-12 PROCEDURE — 97112 NEUROMUSCULAR REEDUCATION: CPT | Mod: GP

## 2018-03-12 PROCEDURE — 40000185 ZZHC STATISTIC PT OUTPT VISIT

## 2018-03-12 NOTE — ADDENDUM NOTE
Encounter addended by: Estefany Ford, PT on: 3/12/2018 12:01 PM<BR>     Actions taken: Flowsheet accepted

## 2018-03-21 ENCOUNTER — OFFICE VISIT (OUTPATIENT)
Dept: OTOLARYNGOLOGY | Facility: CLINIC | Age: 63
End: 2018-03-21
Payer: COMMERCIAL

## 2018-03-21 DIAGNOSIS — G51.9 FACIAL NERVE DISORDER: Primary | ICD-10-CM

## 2018-03-21 PROCEDURE — 64612 DESTROY NERVE FACE MUSCLE: CPT | Performed by: OTOLARYNGOLOGY

## 2018-03-21 ASSESSMENT — PAIN SCALES - GENERAL: PAINLEVEL: NO PAIN (0)

## 2018-03-21 NOTE — MR AVS SNAPSHOT
After Visit Summary   3/21/2018    Zakiya Hopkins    MRN: [id number]           Patient Information     Date Of Birth          1955        Visit Information        Provider Department      3/21/2018 1:00 PM Gilmer Metcalf MD [facility]        Today's Diagnoses     Facial nerve disorder    -  1       Follow-ups after your visit        Follow-up notes from your care team     Return if symptoms worsen or fail to improve.      Who to contact     If you have questions or need follow up information about today's clinic visit or your schedule please contact [facility] directly at [phone].  Normal or non-critical lab and imaging results will be communicated to you by MyChart, letter or phone within 4 business days after the clinic has received the results. If you do not hear from us within 7 days, please contact the clinic through MyChart or phone. If you have a critical or abnormal lab result, we will notify you by phone as soon as possible.  Submit refill requests through [employer] or call your pharmacy and they will forward the refill request to us. Please allow 3 business days for your refill to be completed.          Additional Information About Your Visit        MyChart Information     [employer] is an electronic gateway that provides easy, online access to your medical records. With [employer], you can request a clinic appointment, read your test results, renew a prescription or communicate with your care team.     To sign up for [employer] visit the website at [email or web address]   You will be asked to enter the access code listed below, as well as some personal information. Please follow the directions to create your username and password.     Your access code is: [email or web address]  Expires: 2018  6:48 PM     Your access code will  in 90 days. If you need help or a new code, please contact your [facility] or call  [phone] for assistance.        Care EveryWhere ID     This is your Care EveryWhere ID. This could be used by other organizations to access your [facility] medical records  [id number]         Blood Pressure from Last 3 Encounters:   03/06/18 136/84   12/13/17 146/88   10/10/16 122/74    Weight from Last 3 Encounters:   03/06/18 58.4 kg (128 lb 11 oz)   12/13/17 59.9 kg (132 lb)   10/10/16 60.2 kg (132 lb 12.8 oz)              We Performed the Following     BOTOX 25-50 UNITS     CHEMODENERVATION, FACE NERVE MUSCLE        Primary Care Provider Office Phone # Fax #    Armani JODY Guidry [phone] [address] [facility] [address]        Equal Access to Services     [facility]: Hadii aad ku hadasho Sojeny, waaxda luqadaha, qaybta kaalmada fabyyaterri, shelby silva . So [facility] [phone].    ATENCIÓN: Si habla español, tiene a coughlin disposición servicios gratuitos de asistencia lingüística. Zoe al [phone].    We comply with applicable federal civil rights laws and [place] laws. We do not discriminate on the basis of race, color, national origin, age, disability, sex, sexual orientation, or gender identity.            Thank you!     Thank you for choosing [facility]  for your care. Our goal is always to provide you with excellent care. Hearing back from our patients is one way we can continue to improve our services. Please take a few minutes to complete the written survey that you may receive in the mail after your visit with us. Thank you!             Your Updated Medication List - Protect others around you: Learn how to safely use, store and throw away your medicines at www.disposemymeds.org.          This list is accurate as of 3/21/18  1:21 PM.  Always use your most recent med list.                   Brand Name Dispense Instructions for use Diagnosis    * AMBIEN 5 MG tablet   Generic drug:  zolpidem      Take 1 tablet by  mouth At Bedtime        * zolpidem 10 MG tablet    AMBIEN     Take 10 mg by mouth nightly as needed for sleep        Cyanocobalamin Alisia      Take 1 tablet by mouth daily        diphenhydrAMINE-acetaminophen  MG tablet    TYLENOL PM     Take 1 tablet by mouth At Bedtime Max acetaminophen dose: 4000mg in 24 hrs.        erythromycin ophthalmic ointment    ROMYCIN     As instructed per provider. For incisions near the eyes, apply ointment to all incisions or open wounds every 2 hours while awake. Your incisions and wounds should be covered at all times with ointment to promote healing.        oxyCODONE 5 MG capsule    OXY-IR     Take 1 capsule by mouth every 6 hours        PREMARIN 0.625 MG tablet   Generic drug:  estrogens (conjugated)      Take 1 tablet by mouth daily        REFRESH P.M. OP      Apply  to eye.        tiZANidine 4 MG tablet    ZANAFLEX    30 tablet    Take 1 tablet (4 mg) by mouth 3 times daily as needed for muscle spasms    Strain of neck muscle, initial encounter       Turmeric Powd           * vitamin D 1000 UNITS capsule      Take 1 capsule by mouth daily        * vitamin D3 1000 UNITS Caps      Take 2 capsules by mouth daily        * Notice:  This list has 4 medication(s) that are the same as other medications prescribed for you. Read the directions carefully, and ask your doctor or other care provider to review them with you.

## 2018-03-21 NOTE — LETTER
3/21/2018         RE: Zakiya Hopkins  [address] [address] [place] [address]        Dear Colleague,    Thank you for referring your patient, Zakiya Hopkins, to the [facility]. Please see a copy of my visit note below.    CLINICAL SUMMARY:   Diagnoses:    1) Facial Paralysis following vestibular schwannoma.  -7/30/12: Right middle fossa approach to excision of right acoustic neuroma with Jesús Altman and Lambert.   -8/20/12: Right upper eyelid platinum weight implant- 1.8g.   -10/29/15: removal of upper eyelid weight.    2) Right periorbital and chin synkinesis.  -Botox: 1/27/16, 6/29/16.  Comorbidities: None    Pertinent medications: None    Photographs: [facility] consents signed September 10, 2015.   Connection: N/A   Care Checklist:   _follow up phone call with the nursing staff in 2-3 weeks.      She reports no significant twitching at her eye. The tightness and spasms of her lower face is most bothersome. These spasms have resolved with past botox injections. She notices dimpling of her chin.  The synkinesis is associated with tightness. She has also noticed the chin dimpling has returned. I recommend botox treatment at our previous dosage with the addition of her platysma since her lower facial spasms are so severe. No side effects from the last botox treatment. She consented to add treatment to her platysma.           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      Postoperative diagnosis: same.      Procedure:  Botox injection to the right chin, 45u total.  -Right chin 5u x 3  -Right neck 5u x 6        Botox informaton: Allergan Onabotulinumtoxin A 100u/vial. Lot#[id number], Expiration 10/2020. NDC 8773-6297-11      A preoperative discussion was held. The Ms. Hopkins stated she understood the risks, benefits, alternatives, and limitations of the procedure. The risks, including but not limited to, bleeding, discomfort at the  injection site, headache, neck pain, dry mouth, tiredness, loss of strength in the body, hoarseness, trouble saying words, trouble breathing, trouble swallowing, eye problems such as double vision, blurred vision, decreased eyesight, drooping eyelids, swelling of eyelids, dry eyes, and unforseen complications related to the procedure were discussed. I emphasized the possibility of worsening of her eye function given her history of facial paralysis and eye dryness. I also emphasized the temporary nature of the treatment and more treatment may be necessary in the future. She stated that her questions were answered to her satisfaction. She wished to undergo the procedure.      Procedure: After informed consent was obtained, the patient was prepped with an alcohol pad and marked. Reconstituted botox 100u with a 2mL dilution of unpreserved saline was used during the procedure.    45u total was injected at each of the following sites:    -Right chin 5u x 3.  -Right neck 5u x 6.  Hemostasis was excellent. The patient tolerated the procedure well and was discharged home in stable condition. She was instructed to avoid rubbing the injection site for at least 36 hours, keep her head elevated for 6 hours, minimize facial movement, and to call if they have questions or if problems arise.    Again, thank you for allowing me to participate in the care of your patient.        Sincerely,        Gilmer Metcalf MD

## 2018-03-21 NOTE — NURSING NOTE
"Zakiya Hopkins's goals for this visit include:   Chief Complaint   Patient presents with     Botox     theraputic facial botox treatment       She requests these members of her care team be copied on today's visit information: Armani Guidry      PCP: Armani Guidry    Referring Provider:  No referring provider defined for this encounter.    Chief Complaint   Patient presents with     Botox     theraputic facial botox treatment       Initial There were no vitals taken for this visit. Estimated body mass index is 21.09 kg/(m^2) as calculated from the following:    Height as of 12/13/17: 1.664 m (5' 5.5\").    Weight as of 3/6/18: 58.4 kg (128 lb 11 oz).  Medication Reconciliation: complete    Do you need any medication refills at today's visit? No    Chichi Ramirez RN    "

## 2018-03-21 NOTE — PROGRESS NOTES
CLINICAL SUMMARY:   Diagnoses:    1) Facial Paralysis following vestibular schwannoma.  -7/30/12: Right middle fossa approach to excision of right acoustic neuroma with Jesús Altman and Lambert.   -8/20/12: Right upper eyelid platinum weight implant- 1.8g.   -10/29/15: removal of upper eyelid weight.    2) Right periorbital and chin synkinesis.  -Botox: 1/27/16, 6/29/16.  Comorbidities: None    Pertinent medications: None    Photographs: [facility] consents signed September 10, 2015.   Connection: N/A   Care Checklist:   _follow up phone call with the nursing staff in 2-3 weeks.      She reports no significant twitching at her eye. The tightness and spasms of her lower face is most bothersome. These spasms have resolved with past botox injections. She notices dimpling of her chin.  The synkinesis is associated with tightness. She has also noticed the chin dimpling has returned. I recommend botox treatment at our previous dosage with the addition of her platysma since her lower facial spasms are so severe. No side effects from the last botox treatment. She consented to add treatment to her platysma.           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      Postoperative diagnosis: same.      Procedure:  Botox injection to the right chin, 45u total.  -Right chin 5u x 3  -Right neck 5u x 6        Botox informaton: Allergan Onabotulinumtoxin A 100u/vial. Lot#[id number], Expiration 10/2020. NDC 6412-7534-57      A preoperative discussion was held. The Ms. Hopkins stated she understood the risks, benefits, alternatives, and limitations of the procedure. The risks, including but not limited to, bleeding, discomfort at the injection site, headache, neck pain, dry mouth, tiredness, loss of strength in the body, hoarseness, trouble saying words, trouble breathing, trouble swallowing, eye problems such as double vision, blurred vision, decreased eyesight, drooping eyelids, swelling  of eyelids, dry eyes, and unforseen complications related to the procedure were discussed. I emphasized the possibility of worsening of her eye function given her history of facial paralysis and eye dryness. I also emphasized the temporary nature of the treatment and more treatment may be necessary in the future. She stated that her questions were answered to her satisfaction. She wished to undergo the procedure.      Procedure: After informed consent was obtained, the patient was prepped with an alcohol pad and marked. Reconstituted botox 100u with a 2mL dilution of unpreserved saline was used during the procedure.    45u total was injected at each of the following sites:    -Right chin 5u x 3.  -Right neck 5u x 6.  Hemostasis was excellent. The patient tolerated the procedure well and was discharged home in stable condition. She was instructed to avoid rubbing the injection site for at least 36 hours, keep her head elevated for 6 hours, minimize facial movement, and to call if they have questions or if problems arise.

## 2018-05-07 NOTE — ADDENDUM NOTE
Encounter addended by: Estefany Ford, PT on: 5/7/2018 11:02 AM<BR>     Actions taken: Sign clinical note, Episode resolved

## 2018-05-07 NOTE — PROGRESS NOTES
Outpatient Physical Therapy Discharge Note     Patient: Zakiya Hopkins  : 1955    Beginning/End Dates of Reporting Period:  3/8/18 to 2018    Referring Provider: Dr. Guidry    Therapy Diagnosis: BPPV     Client Self Report: Symptoms of nauease was fine until shopping at "[employer] this morning and sx are worse again, no spinning sensation.  She did balance exercises this morning and they are still challenging.  Driving has been much improved too, no car sickness.     Goals:  Goal Identifier neck   Goal Description Patient comfortable cervical ROM for driving in 4 weeks   Target Date 18   Date Met  18   Progress:     Goal Identifier nausea   Goal Description Patient have decreased nausea to feel comforable cooking meals and eating in 4 weeks.    Target Date 18   Date Met  18   Progress:     Goal Identifier bending   Goal Description Pt able to bend and  things from the floor without vertigo sx in 8 weeks.    Target Date 18   Date Met  18   Progress:     Goal Identifier HEP   Goal Description Pt able to manage any residiual vestibular sx with HEP in 8 weeks.    Target Date 18   Date Met  18   Progress:       Progress Toward Goals:   Progress this reporting period: goals met    Plan:  Discharge from therapy.    Discharge:    Reason for Discharge: Patient has met all goals.    Equipment Issued: na    Discharge Plan: Patient to continue home program.  We kept chart open x1 month in case of exacerbation, haven't heard back from pt.

## 2018-07-02 DIAGNOSIS — G47.00 INSOMNIA, UNSPECIFIED TYPE: Primary | ICD-10-CM

## 2018-07-03 RX ORDER — ZOLPIDEM TARTRATE 10 MG/1
TABLET ORAL
Qty: 30 TABLET | Refills: 5 | Status: SHIPPED | OUTPATIENT
Start: 2018-07-03 | End: 2018-07-09

## 2018-07-03 NOTE — TELEPHONE ENCOUNTER
Ambien        Last Written Prescription Date: 12.13.17 as per Care Everywhere summary  Last Fill Quantity: 30 tabs, # refills: 5 as per care everywhere summary  Last Office Visit: 3/6/18 with PCP as per chart review  Future Office visit: None Scheduled      Routing refill request to provider for review/approval because:  Drug not on the [facility], P or [facility] refill protocol or controlled substance    Unable to complete prescription refill per RN Medication Refill Policy. Thanh Roberts 7/3/2018 4:05 PM

## 2018-07-09 DIAGNOSIS — G47.00 INSOMNIA, UNSPECIFIED TYPE: ICD-10-CM

## 2018-07-09 RX ORDER — ZOLPIDEM TARTRATE 10 MG/1
10 TABLET ORAL
Qty: 30 TABLET | Refills: 5 | Status: SHIPPED | OUTPATIENT
Start: 2018-07-09 | End: 2019-01-28

## 2018-07-09 NOTE — TELEPHONE ENCOUNTER
[facility] called that they did not receive rx for Ambien, requesting again.  Kandy Vargas LPN .............7/9/2018     1:32 PM

## 2018-12-04 ENCOUNTER — TRANSFERRED RECORDS (OUTPATIENT)
Dept: HEALTH INFORMATION MANAGEMENT | Facility: OTHER | Age: 63
End: 2018-12-04

## 2019-01-28 DIAGNOSIS — G47.00 INSOMNIA, UNSPECIFIED TYPE: ICD-10-CM

## 2019-01-29 NOTE — TELEPHONE ENCOUNTER
Chart review shows that Rx as requested was last filled as noted below:    Medication Detail      Disp Refills Start End ALEAH   zolpidem (AMBIEN) 10 MG tablet 30 tablet 5 7/9/2018  No   Sig - Route: Take 1 tablet (10 mg) by mouth nightly as needed for sleep - Oral   Class: Local Print   Order: [id number]   Printout Tracking     External Result Report   Pharmacy     [facility] - [address]     And is due for a refill as of 1/9/19. LOV with PCP was on 3/6/18. Rx as requested is noted in office visit notes on that date with no changes and no specific follow up timeline is noted as well. Writer will rafael up and route Rx request to PCP for his consideration/approval.    Unable to complete prescription refill per RN Medication Refill Policy. Thanh Roberts 1/29/2019 9:27 AM

## 2019-01-30 RX ORDER — ZOLPIDEM TARTRATE 10 MG/1
TABLET ORAL
Qty: 30 TABLET | Refills: 5 | Status: SHIPPED | OUTPATIENT
Start: 2019-01-30 | End: 2019-05-20

## 2019-02-12 ENCOUNTER — OFFICE VISIT (OUTPATIENT)
Dept: FAMILY MEDICINE | Facility: OTHER | Age: 64
End: 2019-02-12
Attending: FAMILY MEDICINE
Payer: MEDICARE

## 2019-02-12 VITALS
SYSTOLIC BLOOD PRESSURE: 138 MMHG | BODY MASS INDEX: 21.63 KG/M2 | HEART RATE: 60 BPM | RESPIRATION RATE: 16 BRPM | DIASTOLIC BLOOD PRESSURE: 82 MMHG | WEIGHT: 132 LBS | TEMPERATURE: 98.7 F

## 2019-02-12 DIAGNOSIS — G51.39 FACIAL SPASM: ICD-10-CM

## 2019-02-12 DIAGNOSIS — L57.0 ACTINIC KERATOSIS: ICD-10-CM

## 2019-02-12 DIAGNOSIS — M67.40 GANGLION CYST: Primary | ICD-10-CM

## 2019-02-12 PROCEDURE — 99213 OFFICE O/P EST LOW 20 MIN: CPT | Mod: 25 | Performed by: FAMILY MEDICINE

## 2019-02-12 PROCEDURE — 17003 DESTRUCT PREMALG LES 2-14: CPT | Performed by: FAMILY MEDICINE

## 2019-02-12 PROCEDURE — G0463 HOSPITAL OUTPT CLINIC VISIT: HCPCS

## 2019-02-12 PROCEDURE — 17000 DESTRUCT PREMALG LESION: CPT | Performed by: FAMILY MEDICINE

## 2019-02-12 PROCEDURE — G0463 HOSPITAL OUTPT CLINIC VISIT: HCPCS | Mod: 25

## 2019-02-12 RX ORDER — CYCLOBENZAPRINE HCL 5 MG
5-10 TABLET ORAL 3 TIMES DAILY PRN
Qty: 30 TABLET | Refills: 11 | Status: SHIPPED | OUTPATIENT
Start: 2019-02-12 | End: 2020-03-31

## 2019-02-12 ASSESSMENT — PAIN SCALES - GENERAL: PAINLEVEL: MILD PAIN (3)

## 2019-02-12 NOTE — NURSING NOTE
"Chief Complaint   Patient presents with     Derm Problem     lesions on chest and left arm       Initial BP (!) 148/94   Pulse 60   Temp 98.7  F (37.1  C) (Temporal)   Resp 16   Wt 59.9 kg (132 lb)   Breastfeeding? No   BMI 21.63 kg/m   Estimated body mass index is 21.63 kg/m  as calculated from the following:    Height as of 12/13/17: 1.664 m (5' 5.5\").    Weight as of this encounter: 59.9 kg (132 lb).  Medication Reconciliation: complete    Mary Howard LPN  "

## 2019-02-12 NOTE — PROGRESS NOTES
"Nursing Notes:   Mary Howard, LPN  2/12/2019  4:57 PM  Signed  Chief Complaint   Patient presents with     Derm Problem     lesions on chest and left arm       Initial BP (!) 148/94   Pulse 60   Temp 98.7  F (37.1  C) (Temporal)   Resp 16   Wt 59.9 kg (132 lb)   Breastfeeding? No   BMI 21.63 kg/m    Estimated body mass index is 21.63 kg/m  as calculated from the following:    Height as of 12/13/17: 1.664 m (5' 5.5\").    Weight as of this encounter: 59.9 kg (132 lb).  Medication Reconciliation: complete    Mary Howard LPN    SUBJECTIVE:  Zakiya Hopkins  is a 63 year old female who comes in today for several concerns.    She has some actinic lesions on her chest and on her left arm.    She has a plugged sensation in her right ear.  That is the side that she had an acoustic neuroma excised in 2012.  She last had botox about a year ago. She still has spasms. Tizanidine didn't help. Flexeril is better.  She doesn't like doing the shots.  Vertigo is an ongoing issue but it has not really changed    She has a ganglion cyst on her right thumb.  It is become symptomatic and painful.     She has not had a flu shot yet this year.  She has not had Shingrix.  These were discussed    Past Medical, Family, and Social History reviewed and updated as noted below.   ROS is negative except as noted above       Allergies   Allergen Reactions     Hydromorphone Nausea and Nausea and Vomiting     ha     Oxycodone Nausea     ha     Erythromycin Swelling     Other reaction(s): Edema  ointment  Ophthalmic route     Meperidine Nausea and Vomiting   ,   Family History   Problem Relation Age of Onset     Other - See Comments Mother         Chemical dependency/Alzheimers     Hypertension Father         Hypertension     Diabetes Father         Diabetes     Other - See Comments Father         Chemical depedency/Stroke     Heart Disease Father         Heart Disease,angioplasty     Breast Cancer Maternal Grandmother         " Cancer-breast     Heart Disease Maternal Grandmother         Heart Disease,CAD     Breast Cancer Sister         Cancer-breast     Other - See Comments Sister         Bipolar     Substance Abuse Brother         Alcohol/Drug,Alcoholic   ,   Current Outpatient Medications   Medication     Artificial Tear Ointment (REFRESH P.M. OP)     Cholecalciferol (VITAMIN D3) 1000 UNITS CAPS     Cyanocobalamin GEMINI     cyclobenzaprine (FLEXERIL) 5 MG tablet     diphenhydrAMINE-acetaminophen (TYLENOL PM)  MG tablet     zolpidem (AMBIEN) 10 MG tablet     No current facility-administered medications for this visit.    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     Personal history of other medical treatment (CODE)     age 11   ,   Patient Active Problem List    Diagnosis Date Noted     Lesion of plantar nerve 01/26/2018     Priority: Medium     Insomnia 10/20/2015     Priority: Medium     Facial nerve palsy, secondary 09/10/2015     Priority: Medium     Vertigo of central origin 11/03/2012     Priority: Medium     C6 radiculopathy 11/01/2012     Priority: Medium     Acoustic neuroma (H) 07/17/2012     Priority: Medium     Arthropathy 10/04/2011     Priority: Medium   ,   Past Surgical History:   Procedure Laterality Date     COLONOSCOPY      04/05     CRANIOTOMY MIDDLE FOSSA, EXCISE ACOUSTIC NEUROMA, COMBINED Right 08/2012    [facility]     HYSTERECTOMY TOTAL ABDOMINAL, BILATERAL SALPINGO-OOPHORECTOMY, COMBINED      No Comments Provided    and   Social History     Tobacco Use     Smoking status: Never Smoker     Smokeless tobacco: Never Used   Substance Use Topics     Alcohol use: Yes     Comment: Alcoholic Drinks/day: rare     OBJECTIVE:  /82   Pulse 60   Temp 98.7  F (37.1  C) (Temporal)    Resp 16   Wt 59.9 kg (132 lb)   Breastfeeding? No   BMI 21.63 kg/m     EXAM:  Alert and cooperative, no distress.  Right TM appears clear and there is only a minimal amount of wax in the right ear.  She has some right facial spasm present.  She has a ganglion cyst on the right thumb that is symptomatic.  She has 6 actinic keratoses across her chest and left arm that are frozen lightly with liquid nitrogen.  ASSESSMENT/Plan :    Zakiya was seen today for derm problem.    Diagnoses and all orders for this visit:    Ganglion cyst  -     ORTHOPEDICS ADULT REFERRAL    Facial spasm  -     cyclobenzaprine (FLEXERIL) 5 MG tablet; Take 1-2 tablets (5-10 mg) by mouth 3 times daily as needed for muscle spasms    Vertigo of central origin of right ear    Actinic keratosis      Referred to Dr. Chaparro for evaluation for ganglion cyst.    Will stop tizanidine and try Flexeril which seemed to work better for her.    Consideration of Botox for her facial spasm.    Advised regarding the pain and vesiculation reaction that could be expected from cyrotherapy.      A total of 15 minutes was spent with the patient, greater than 50% of the time was spent in counseling/discussion of the aforementioned concerns.     Armani Guidry MD

## 2019-03-12 ENCOUNTER — HOSPITAL ENCOUNTER (OUTPATIENT)
Dept: MAMMOGRAPHY | Facility: OTHER | Age: 64
Discharge: HOME | End: 2019-03-12
Attending: FAMILY MEDICINE | Admitting: FAMILY MEDICINE
Payer: MEDICARE

## 2019-03-12 DIAGNOSIS — Z12.39 SCREENING BREAST EXAMINATION: ICD-10-CM

## 2019-03-12 PROCEDURE — 77063 BREAST TOMOSYNTHESIS BI: CPT

## 2019-04-03 DIAGNOSIS — M79.644 THUMB PAIN, RIGHT: Primary | ICD-10-CM

## 2019-04-05 ENCOUNTER — HOSPITAL ENCOUNTER (OUTPATIENT)
Dept: GENERAL RADIOLOGY | Facility: OTHER | Age: 64
Discharge: HOME | End: 2019-04-05
Attending: SPECIALIST | Admitting: SPECIALIST
Payer: MEDICARE

## 2019-04-05 ENCOUNTER — OFFICE VISIT (OUTPATIENT)
Dept: ORTHOPEDICS | Facility: OTHER | Age: 64
End: 2019-04-05
Attending: FAMILY MEDICINE
Payer: MEDICARE

## 2019-04-05 DIAGNOSIS — M67.40 GANGLION CYST: ICD-10-CM

## 2019-04-05 DIAGNOSIS — M79.644 THUMB PAIN, RIGHT: ICD-10-CM

## 2019-04-05 PROCEDURE — 73130 X-RAY EXAM OF HAND: CPT | Mod: RT

## 2019-04-05 PROCEDURE — G0463 HOSPITAL OUTPT CLINIC VISIT: HCPCS

## 2019-04-12 NOTE — PROGRESS NOTES
VITALS:   Height:   66  Weight:   135  Pulse rate:  60  Blood Pressure:  148 / 94      CHIEF COMPLAINT: Right Thumb Cyst    PROBLEMS:   Patient has no noted problems.    PATIENT REPORTED MEDICATIONS:  * OTC ANALGESICS   CYCLOBENZAPRINE HCL TABLET (CYCLOBENZAPRINE HCL TABS)   AMBIEN TABLET (ZOLPIDEM TARTRATE TABS)     Medications were reviewed with patient this visit.    PATIENT REPORTED ALLERGIES:  * ERYTHROMYCIN EYE OINTMENT (SevereReaction: Unknown Reaction)    Allergies were reviewed during this visit.    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    She has noted this for some time.   She has had some ongoing discomfort with aching pain.   This is worse with extended use.    She also has trouble with writing.       PMH:   Health history form dated 04/05/19 is reviewed and signed.  Past medical history, surgical history, social history, family history, medications, and review of systems is noted and scanned into the chart.     PAST MEDICAL HISTORY:    Arthritis    PAST ORTHOPEDIC SURGICAL HISTORY:    Right Wrist Ganglion Cyst Removal    PAST SURGICAL HISTORY:    SAPPHIRE BSO  Acoustic Neuroma 2012    FAMILY HISTORY:    Father:  Heart Issue, Stroke, Diabetes  Mother:  Alcoholism, Dementia  Brother:  Alcoholism, Arthritis  Sister:  Cancer    SOCIAL HISTORY:   Marital Status:    Occupation:  Retired RN  Alcohol Use:  Yes  Tobacco Use:  Never  Secondhand Smoke Exposure:  Yes, as child  History HIV:  No  History Hepatitis:  No    REVIEW OF SYSTEMS:  Joint or Muscle pain: Yes  Stiffness:  Yes  Swelling:  No  Difficulty in walking: No  Cold extremities: No  Weakness of muscles: No  Rash or Itching: No  Bruising:  No  Numbness/Tingling: No    PHYSICAL  EXAMINATION:    General:    Physical examination demonstrates a 64-year-old female.  The patient is pleasant, alert, oriented x3, appropriate, in no acute distress.    The patient's gait and station are appropriate.  The patient is well groomed, well kempt.  Skin is healthy and intact with no erythema, warmth, rashes, or bruising.   Normal capillary refill.  Pulses are palpable.  Motor is five out of five in all muscle groups tested.   Sensory exam is intact.    Musculoskeletal:        Examination of both upper extremities reveals full and symmetric range of motion of shoulders, elbows, wrists.    Examination of the right thumb reveals a mucous cyst along the radial side of the thumb.  She has no evidence of instability with mild discomfort.        X-RAY:  X-rays of the right thumb were reviewed.   These show degenerative changes of the right thumb IP joint.    ASSESSMENT:    Right Thumb IP Joint Mucous Cyst    PLAN:   Right Thumb IP Joint Mucous Cyst:  We discussed elective excision.   Risks, complications, and benefits were reviewed and this can be scheduled at the patient's convenience.      Dictated by John Chaparro M.D.  D:  04/05/19  T:   04/11/19    Copy to:  Armani Guidry MD     Typed and/or reviewed and corrected by signing  below, and sent to the Physician for final review and signature.     This report was created using voice recording software and computer-generated templates. Although every effort has been made to review for and eliminate errors, some errors may still occur.         Electronically signed by Rachell East  on 04/11/2019 at 10:18 AM    Electronically signed by John Chaparro MD on 04/11/2019 at 10:57 AM  ________________________________________________________________________

## 2019-05-20 ENCOUNTER — OFFICE VISIT (OUTPATIENT)
Dept: FAMILY MEDICINE | Facility: OTHER | Age: 64
End: 2019-05-20
Attending: FAMILY MEDICINE
Payer: COMMERCIAL

## 2019-05-20 VITALS
BODY MASS INDEX: 21.53 KG/M2 | TEMPERATURE: 98 F | HEIGHT: 66 IN | RESPIRATION RATE: 16 BRPM | DIASTOLIC BLOOD PRESSURE: 86 MMHG | OXYGEN SATURATION: 98 % | SYSTOLIC BLOOD PRESSURE: 138 MMHG | HEART RATE: 63 BPM | WEIGHT: 134 LBS

## 2019-05-20 DIAGNOSIS — G51.0 FACIAL NERVE PALSY, SECONDARY: ICD-10-CM

## 2019-05-20 DIAGNOSIS — Z01.818 PREOPERATIVE EXAMINATION: Primary | ICD-10-CM

## 2019-05-20 DIAGNOSIS — M67.441 MUCOUS CYST OF DIGIT OF RIGHT HAND: ICD-10-CM

## 2019-05-20 LAB
ANION GAP SERPL CALCULATED.3IONS-SCNC: 5 MMOL/L (ref 3–14)
BASOPHILS # BLD AUTO: 0 10E9/L (ref 0–0.2)
BASOPHILS NFR BLD AUTO: 0.5 %
BUN SERPL-MCNC: 18 MG/DL (ref 7–25)
CALCIUM SERPL-MCNC: 9.7 MG/DL (ref 8.6–10.3)
CHLORIDE SERPL-SCNC: 103 MMOL/L (ref 98–107)
CO2 SERPL-SCNC: 29 MMOL/L (ref 21–31)
CREAT SERPL-MCNC: 0.66 MG/DL (ref 0.6–1.2)
DIFFERENTIAL METHOD BLD: ABNORMAL
EOSINOPHIL # BLD AUTO: 0.1 10E9/L (ref 0–0.7)
EOSINOPHIL NFR BLD AUTO: 1.4 %
ERYTHROCYTE [DISTWIDTH] IN BLOOD BY AUTOMATED COUNT: 12.6 % (ref 10–15)
GFR SERPL CREATININE-BSD FRML MDRD: 90 ML/MIN/{1.73_M2}
GLUCOSE SERPL-MCNC: 92 MG/DL (ref 70–105)
HCT VFR BLD AUTO: 46.7 % (ref 35–47)
HGB BLD-MCNC: 15.2 G/DL (ref 11.7–15.7)
IMM GRANULOCYTES # BLD: 0 10E9/L (ref 0–0.4)
IMM GRANULOCYTES NFR BLD: 0.4 %
LYMPHOCYTES # BLD AUTO: 1.6 10E9/L (ref 0.8–5.3)
LYMPHOCYTES NFR BLD AUTO: 28.4 %
MCH RBC QN AUTO: 29.1 PG (ref 26.5–33)
MCHC RBC AUTO-ENTMCNC: 32.5 G/DL (ref 31.5–36.5)
MCV RBC AUTO: 89 FL (ref 78–100)
MONOCYTES # BLD AUTO: 0.5 10E9/L (ref 0–1.3)
MONOCYTES NFR BLD AUTO: 8 %
NEUTROPHILS # BLD AUTO: 3.4 10E9/L (ref 1.6–8.3)
NEUTROPHILS NFR BLD AUTO: 61.3 %
PLATELET # BLD AUTO: 275 10E9/L (ref 150–450)
POTASSIUM SERPL-SCNC: 4.7 MMOL/L (ref 3.5–5.1)
RBC # BLD AUTO: 5.23 10E12/L (ref 3.8–5.2)
SODIUM SERPL-SCNC: 137 MMOL/L (ref 134–144)
WBC # BLD AUTO: 5.6 10E9/L (ref 4–11)

## 2019-05-20 PROCEDURE — 36415 COLL VENOUS BLD VENIPUNCTURE: CPT | Mod: ZL | Performed by: FAMILY MEDICINE

## 2019-05-20 PROCEDURE — 80048 BASIC METABOLIC PNL TOTAL CA: CPT | Mod: ZL | Performed by: FAMILY MEDICINE

## 2019-05-20 PROCEDURE — 85025 COMPLETE CBC W/AUTO DIFF WBC: CPT | Mod: ZL | Performed by: FAMILY MEDICINE

## 2019-05-20 PROCEDURE — G0463 HOSPITAL OUTPT CLINIC VISIT: HCPCS | Mod: 25

## 2019-05-20 PROCEDURE — 93005 ELECTROCARDIOGRAM TRACING: CPT | Performed by: FAMILY MEDICINE

## 2019-05-20 PROCEDURE — 93010 ELECTROCARDIOGRAM REPORT: CPT | Performed by: INTERNAL MEDICINE

## 2019-05-20 PROCEDURE — 99214 OFFICE O/P EST MOD 30 MIN: CPT | Performed by: FAMILY MEDICINE

## 2019-05-20 ASSESSMENT — PAIN SCALES - GENERAL: PAINLEVEL: MILD PAIN (2)

## 2019-05-20 ASSESSMENT — MIFFLIN-ST. JEOR: SCORE: 1160.6

## 2019-05-20 NOTE — PROGRESS NOTES
"Nursing Notes:   Mary Howard LPN  5/20/2019 10:00 AM  Signed  Chief Complaint   Patient presents with     Pre-Op Exam     surgery date 05/31/2019       Initial /86   Pulse 63   Temp 98  F (36.7  C) (Temporal)   Resp 16   Ht 1.67 m (5' 5.75\")   Wt 60.8 kg (134 lb)   SpO2 98%   Breastfeeding? No   BMI 21.79 kg/m    Estimated body mass index is 21.79 kg/m  as calculated from the following:    Height as of this encounter: 1.67 m (5' 5.75\").    Weight as of this encounter: 60.8 kg (134 lb).  Medication Reconciliation: complete    Mary Howard LPN     Date of Surgery: 05/31/2019  Type of Surgery: Right thumb joint cyst excision  Surgeon: Dr Chaparro  Hospital:  [facility]      Fever/Chills or other infectious symptoms in past month: no  >10lb weight loss in past two months: no    Health Care Directive/Code status:  no  Hx of blood transfusions:   no   Td up to date:  yes  History of VRE/MRSA:  no     Preoperative Evaluation: Obstructive Sleep Apnea screening    S:Snore -  Do you snore loudly? (louder than talking or loud enough to be heard through closed doors) yes  T: Tired - Do you often feel tired, fatigued, or sleepy during the daytime? yes  O: Observed - Has anyone ever observed you stop breathing during your sleep? no  P: Pressure - Do you have or are you being treated for high blood pressure? no  B: BMI - BMI greater than 35kg/m2? no  A: Age - Age over 50 years old? yes  N: Neck - Neck circumference greater than 40 cm? no  G: Gender - Gender: Male? no    Total number of \"YES\" responses:  3    Scoring: Low risk of MECHELLE 0-2  At Risk of MECHELLE: >3 High Risk of MECHELLE: 5-8    Mary Howard LPN........................5/20/2019  9:56 AM              ----------------- PREOPERATIVE EXAM ------------------  5/20/2019    SUBJECTIVE:  Zakiya Hopkins is a 64 year old female here for preoperative optimization.    Preoperative risk assessment consultation was requested on Zakiya Hopkins by  " Krysta prior to excision of mucous cyst of right thumb.   This is scheduled for May 31, 2019 at [facility]. I am asked to see this patient for preoperative clearance prior to this procedure.     Patient Active Problem List    Diagnosis Date Noted     Lesion of plantar nerve 01/26/2018     Priority: Medium     Insomnia 10/20/2015     Priority: Medium     Facial nerve palsy, secondary 09/10/2015     Priority: Medium     Vertigo of central origin 11/03/2012     Priority: Medium     C6 radiculopathy 11/01/2012     Priority: Medium     Acoustic neuroma (H) 07/17/2012     Priority: Medium     Arthropathy 10/04/2011     Priority: Medium       Past Medical History:   Diagnosis Date     Bell's palsy     8/12,Postoperative complication after acoustic neuroma resection     Other shoulder lesions, left shoulder     No Comments Provided     Other specified congenital malformations of intestine     04/05     Personal history of other medical treatment (CODE)     age 11       Past Surgical History:   Procedure Laterality Date     COLONOSCOPY      04/05     CRANIOTOMY MIDDLE FOSSA, EXCISE ACOUSTIC NEUROMA, COMBINED Right 08/2012    [facility]     HYSTERECTOMY TOTAL ABDOMINAL, BILATERAL SALPINGO-OOPHORECTOMY, COMBINED      No Comments Provided       Family History   Problem Relation Age of Onset     Other - See Comments Mother         Chemical dependency/Alzheimers     Hypertension Father         Hypertension     Diabetes Father         Diabetes     Other - See Comments Father         Chemical depedency/Stroke     Heart Disease Father         Heart Disease,angioplasty     Breast Cancer Maternal Grandmother         Cancer-breast     Heart Disease Maternal Grandmother         Heart Disease,CAD     Breast Cancer Sister         Cancer-breast     Other - See Comments Sister         Bipolar     Substance Abuse Brother         Alcohol/Drug,Alcoholic       Social History     Tobacco Use     Smoking  "status: Never Smoker     Smokeless tobacco: Never Used   Substance Use Topics     Alcohol use: Yes     Comment: Alcoholic Drinks/day: rare     Drug use: No     Comment: Drug use: No       Current Outpatient Medications   Medication Sig Dispense Refill     Artificial Tear Ointment (REFRESH P.M. OP) Apply  to eye.       Cholecalciferol (VITAMIN D3) 1000 UNITS CAPS Take 2 capsules by mouth daily       diphenhydrAMINE-acetaminophen (TYLENOL PM)  MG tablet Take 1 tablet by mouth At Bedtime Max acetaminophen dose: 4000mg in 24 hrs.         Allergies:  Allergies   Allergen Reactions     Hydromorphone Nausea and Nausea and Vomiting     ha     Oxycodone Nausea     ha     Erythromycin Swelling     Other reaction(s): Edema  ointment  Ophthalmic route     Meperidine Nausea and Vomiting       ROS:    Surgical:  patient denies previous complications from prior surgeries including but not limited to prolonged bleeding, anesthesia complications, dysrhythmias, surgical wound infections, or prolonged hospital stay.    Denies family hx of bleeding tendencies, anesthesia complications, or other problems with surgery.     -------------------------------------------------------------    PHYSICAL EXAM:  /86   Pulse 63   Temp 98  F (36.7  C) (Temporal)   Resp 16   Ht 1.67 m (5' 5.75\")   Wt 60.8 kg (134 lb)   SpO2 98%   Breastfeeding? No   BMI 21.79 kg/m      EXAM:  General Appearance: Pleasant, alert, appropriate appearance for age. No acute distress  Head Exam: Normal. Normocephalic, atraumatic.  Eyes: PERRL, EOMI  Ears: Normal TM's bilaterally. Normal auditory canals and external ears.   OroPharynx: Normal buccal mucosa. Normal pharynx.  Neck: Supple, no masses or nodes, no lymphadenopathy.  No thyromegaly.  Lungs: Normal chest wall and respirations. Clear to auscultation, no wheezes or crackles.  Cardiovascular: Regular rate and rhythm. S1, S2, no murmurs.  Gastrointestinal: Soft, nontender, no abnormal masses or " organomegaly. BS normal   Musculoskeletal: No edema.  Skin: no concerning or new rashes.  Neurologic Exam: CN 2-12 grossly intact.  Normal gait.  Symmetric DTRs, normal gross motor movement, tone, and coordination. No tremor.  Psychiatric Exam: Alert and oriented, appropriate affect.      EKG:  normal EKG, normal sinus rhythm, sinus bradycardia  ---------------------------------------------------------------  LABS  Results for orders placed or performed in visit on 05/20/19   Basic metabolic panel   Result Value Ref Range    Sodium 137 134 - 144 mmol/L    Potassium 4.7 3.5 - 5.1 mmol/L    Chloride 103 98 - 107 mmol/L    Carbon Dioxide 29 21 - 31 mmol/L    Anion Gap 5 3 - 14 mmol/L    Glucose 92 70 - 105 mg/dL    Urea Nitrogen 18 7 - 25 mg/dL    Creatinine 0.66 0.60 - 1.20 mg/dL    GFR Estimate 90 >60 mL/min/[1.73_m2]    GFR Estimate If Black >90 >60 mL/min/[1.73_m2]    Calcium 9.7 8.6 - 10.3 mg/dL   CBC with platelets differential   Result Value Ref Range    WBC 5.6 4.0 - 11.0 10e9/L    RBC Count 5.23 (H) 3.8 - 5.2 10e12/L    Hemoglobin 15.2 11.7 - 15.7 g/dL    Hematocrit 46.7 35.0 - 47.0 %    MCV 89 78 - 100 fl    MCH 29.1 26.5 - 33.0 pg    MCHC 32.5 31.5 - 36.5 g/dL    RDW 12.6 10.0 - 15.0 %    Platelet Count 275 150 - 450 10e9/L    Diff Method Automated Method     % Neutrophils 61.3 %    % Lymphocytes 28.4 %    % Monocytes 8.0 %    % Eosinophils 1.4 %    % Basophils 0.5 %    % Immature Granulocytes 0.4 %    Absolute Neutrophil 3.4 1.6 - 8.3 10e9/L    Absolute Lymphocytes 1.6 0.8 - 5.3 10e9/L    Absolute Monocytes 0.5 0.0 - 1.3 10e9/L    Absolute Eosinophils 0.1 0.0 - 0.7 10e9/L    Absolute Basophils 0.0 0.0 - 0.2 10e9/L    Abs Immature Granulocytes 0.0 0 - 0.4 10e9/L       ASSESSEMENT AND PLAN:    Zakiya was seen today for pre-op exam.    Diagnoses and all orders for this visit:    Preoperative examination  -     EKG 12-lead, tracing only  -     CBC with platelets differential; Future  -     Basic metabolic  panel; Future  -     Basic metabolic panel  -     CBC with platelets differential    Mucous cyst of digit of right hand    Facial nerve palsy, secondary    Vertigo of central origin of right ear        PRE OP RECOMMENDATIONS:  Patient is on chronic pain medications no   Patient is on antiplatlet/anticoagulation medication no  Other medications that need adjustment perioperatively no    Other:  Patient was advised to call our office and the surgical services with any change in condition or new symptoms if they were to develop between today and their surgical date.  Especially any cardiopulmonary symptoms or symptoms concerning for an infection.    Should be at low risk for planned procedure.        Armani Guidry 5/20/2019

## 2019-05-20 NOTE — LETTER
May 20, 2019      Zakiya Hopkins  [address] [place] [address]        Dear Melly,    Your labs and EKG looked fine.    It was a pleasure seeing you the other day.  If you have any questions, please don't hesitate to call us.            Sincerely,        Armani Guidry MD                                        Results for orders placed or performed in visit on 05/20/19   Basic metabolic panel   Result Value Ref Range    Sodium 137 134 - 144 mmol/L    Potassium 4.7 3.5 - 5.1 mmol/L    Chloride 103 98 - 107 mmol/L    Carbon Dioxide 29 21 - 31 mmol/L    Anion Gap 5 3 - 14 mmol/L    Glucose 92 70 - 105 mg/dL    Urea Nitrogen 18 7 - 25 mg/dL    Creatinine 0.66 0.60 - 1.20 mg/dL    GFR Estimate 90 >60 mL/min/[1.73_m2]    GFR Estimate If Black >90 >60 mL/min/[1.73_m2]    Calcium 9.7 8.6 - 10.3 mg/dL   CBC with platelets differential   Result Value Ref Range    WBC 5.6 4.0 - 11.0 10e9/L    RBC Count 5.23 (H) 3.8 - 5.2 10e12/L    Hemoglobin 15.2 11.7 - 15.7 g/dL    Hematocrit 46.7 35.0 - 47.0 %    MCV 89 78 - 100 fl    MCH 29.1 26.5 - 33.0 pg    MCHC 32.5 31.5 - 36.5 g/dL    RDW 12.6 10.0 - 15.0 %    Platelet Count 275 150 - 450 10e9/L    Diff Method Automated Method     % Neutrophils 61.3 %    % Lymphocytes 28.4 %    % Monocytes 8.0 %    % Eosinophils 1.4 %    % Basophils 0.5 %    % Immature Granulocytes 0.4 %    Absolute Neutrophil 3.4 1.6 - 8.3 10e9/L    Absolute Lymphocytes 1.6 0.8 - 5.3 10e9/L    Absolute Monocytes 0.5 0.0 - 1.3 10e9/L    Absolute Eosinophils 0.1 0.0 - 0.7 10e9/L    Absolute Basophils 0.0 0.0 - 0.2 10e9/L    Abs Immature Granulocytes 0.0 0 - 0.4 10e9/L

## 2019-05-20 NOTE — NURSING NOTE
"Chief Complaint   Patient presents with     Pre-Op Exam     surgery date 05/31/2019       Initial /86   Pulse 63   Temp 98  F (36.7  C) (Temporal)   Resp 16   Ht 1.67 m (5' 5.75\")   Wt 60.8 kg (134 lb)   SpO2 98%   Breastfeeding? No   BMI 21.79 kg/m   Estimated body mass index is 21.79 kg/m  as calculated from the following:    Height as of this encounter: 1.67 m (5' 5.75\").    Weight as of this encounter: 60.8 kg (134 lb).  Medication Reconciliation: complete    Mary Howard LPN     Date of Surgery: 05/31/2019  Type of Surgery: Right thumb joint cyst excision  Surgeon: Dr Chaparro  [facility]:  [facility]      Fever/Chills or other infectious symptoms in past month: no  >10lb weight loss in past two months: no    Health Care Directive/Code status:  no  Hx of blood transfusions:   no   Td up to date:  yes  History of VRE/MRSA:  no     Preoperative Evaluation: Obstructive Sleep Apnea screening    S:Snore -  Do you snore loudly? (louder than talking or loud enough to be heard through closed doors) yes  T: Tired - Do you often feel tired, fatigued, or sleepy during the daytime? yes  O: Observed - Has anyone ever observed you stop breathing during your sleep? no  P: Pressure - Do you have or are you being treated for high blood pressure? no  B: BMI - BMI greater than 35kg/m2? no  A: Age - Age over 50 years old? yes  N: Neck - Neck circumference greater than 40 cm? no  G: Gender - Gender: Male? no    Total number of \"YES\" responses:  3    Scoring: Low risk of MECHELLE 0-2  At Risk of MECHELLE: >3 High Risk of MECHELLE: 5-8    Mary Howard LPN........................5/20/2019  9:56 AM            "

## 2019-05-20 NOTE — H&P (VIEW-ONLY)
"Nursing Notes:   Mary Howard LPN  5/20/2019 10:00 AM  Signed  Chief Complaint   Patient presents with     Pre-Op Exam     surgery date 05/31/2019       Initial /86   Pulse 63   Temp 98  F (36.7  C) (Temporal)   Resp 16   Ht 1.67 m (5' 5.75\")   Wt 60.8 kg (134 lb)   SpO2 98%   Breastfeeding? No   BMI 21.79 kg/m    Estimated body mass index is 21.79 kg/m  as calculated from the following:    Height as of this encounter: 1.67 m (5' 5.75\").    Weight as of this encounter: 60.8 kg (134 lb).  Medication Reconciliation: complete    Mary Howard LPN     Date of Surgery: 05/31/2019  Type of Surgery: Right thumb joint cyst excision  Surgeon: Dr Chaparro  Hospital:  [facility]      Fever/Chills or other infectious symptoms in past month: no  >10lb weight loss in past two months: no    Health Care Directive/Code status:  no  Hx of blood transfusions:   no   Td up to date:  yes  History of VRE/MRSA:  no     Preoperative Evaluation: Obstructive Sleep Apnea screening    S:Snore -  Do you snore loudly? (louder than talking or loud enough to be heard through closed doors) yes  T: Tired - Do you often feel tired, fatigued, or sleepy during the daytime? yes  O: Observed - Has anyone ever observed you stop breathing during your sleep? no  P: Pressure - Do you have or are you being treated for high blood pressure? no  B: BMI - BMI greater than 35kg/m2? no  A: Age - Age over 50 years old? yes  N: Neck - Neck circumference greater than 40 cm? no  G: Gender - Gender: Male? no    Total number of \"YES\" responses:  3    Scoring: Low risk of MECHELLE 0-2  At Risk of MECHELLE: >3 High Risk of MECHELLE: 5-8    Mary Howard LPN........................5/20/2019  9:56 AM              ----------------- PREOPERATIVE EXAM ------------------  5/20/2019    SUBJECTIVE:  Zakiya Hopkins is a 64 year old female here for preoperative optimization.    Preoperative risk assessment consultation was requested on Zakiya Hopkins by  " Krysta prior to excision of mucous cyst of right thumb.   This is scheduled for May 31, 2019 at [facility]. I am asked to see this patient for preoperative clearance prior to this procedure.     Patient Active Problem List    Diagnosis Date Noted     Lesion of plantar nerve 01/26/2018     Priority: Medium     Insomnia 10/20/2015     Priority: Medium     Facial nerve palsy, secondary 09/10/2015     Priority: Medium     Vertigo of central origin 11/03/2012     Priority: Medium     C6 radiculopathy 11/01/2012     Priority: Medium     Acoustic neuroma (H) 07/17/2012     Priority: Medium     Arthropathy 10/04/2011     Priority: Medium       Past Medical History:   Diagnosis Date     Bell's palsy     8/12,Postoperative complication after acoustic neuroma resection     Other shoulder lesions, left shoulder     No Comments Provided     Other specified congenital malformations of intestine     04/05     Personal history of other medical treatment (CODE)     age 11       Past Surgical History:   Procedure Laterality Date     COLONOSCOPY      04/05     CRANIOTOMY MIDDLE FOSSA, EXCISE ACOUSTIC NEUROMA, COMBINED Right 08/2012    [facility]     HYSTERECTOMY TOTAL ABDOMINAL, BILATERAL SALPINGO-OOPHORECTOMY, COMBINED      No Comments Provided       Family History   Problem Relation Age of Onset     Other - See Comments Mother         Chemical dependency/Alzheimers     Hypertension Father         Hypertension     Diabetes Father         Diabetes     Other - See Comments Father         Chemical depedency/Stroke     Heart Disease Father         Heart Disease,angioplasty     Breast Cancer Maternal Grandmother         Cancer-breast     Heart Disease Maternal Grandmother         Heart Disease,CAD     Breast Cancer Sister         Cancer-breast     Other - See Comments Sister         Bipolar     Substance Abuse Brother         Alcohol/Drug,Alcoholic       Social History     Tobacco Use     Smoking  "status: Never Smoker     Smokeless tobacco: Never Used   Substance Use Topics     Alcohol use: Yes     Comment: Alcoholic Drinks/day: rare     Drug use: No     Comment: Drug use: No       Current Outpatient Medications   Medication Sig Dispense Refill     Artificial Tear Ointment (REFRESH P.M. OP) Apply  to eye.       Cholecalciferol (VITAMIN D3) 1000 UNITS CAPS Take 2 capsules by mouth daily       diphenhydrAMINE-acetaminophen (TYLENOL PM)  MG tablet Take 1 tablet by mouth At Bedtime Max acetaminophen dose: 4000mg in 24 hrs.         Allergies:  Allergies   Allergen Reactions     Hydromorphone Nausea and Nausea and Vomiting     ha     Oxycodone Nausea     ha     Erythromycin Swelling     Other reaction(s): Edema  ointment  Ophthalmic route     Meperidine Nausea and Vomiting       ROS:    Surgical:  patient denies previous complications from prior surgeries including but not limited to prolonged bleeding, anesthesia complications, dysrhythmias, surgical wound infections, or prolonged hospital stay.    Denies family hx of bleeding tendencies, anesthesia complications, or other problems with surgery.     -------------------------------------------------------------    PHYSICAL EXAM:  /86   Pulse 63   Temp 98  F (36.7  C) (Temporal)   Resp 16   Ht 1.67 m (5' 5.75\")   Wt 60.8 kg (134 lb)   SpO2 98%   Breastfeeding? No   BMI 21.79 kg/m      EXAM:  General Appearance: Pleasant, alert, appropriate appearance for age. No acute distress  Head Exam: Normal. Normocephalic, atraumatic.  Eyes: PERRL, EOMI  Ears: Normal TM's bilaterally. Normal auditory canals and external ears.   OroPharynx: Normal buccal mucosa. Normal pharynx.  Neck: Supple, no masses or nodes, no lymphadenopathy.  No thyromegaly.  Lungs: Normal chest wall and respirations. Clear to auscultation, no wheezes or crackles.  Cardiovascular: Regular rate and rhythm. S1, S2, no murmurs.  Gastrointestinal: Soft, nontender, no abnormal masses or " organomegaly. BS normal   Musculoskeletal: No edema.  Skin: no concerning or new rashes.  Neurologic Exam: CN 2-12 grossly intact.  Normal gait.  Symmetric DTRs, normal gross motor movement, tone, and coordination. No tremor.  Psychiatric Exam: Alert and oriented, appropriate affect.      EKG:  normal EKG, normal sinus rhythm, sinus bradycardia  ---------------------------------------------------------------  LABS  Results for orders placed or performed in visit on 05/20/19   Basic metabolic panel   Result Value Ref Range    Sodium 137 134 - 144 mmol/L    Potassium 4.7 3.5 - 5.1 mmol/L    Chloride 103 98 - 107 mmol/L    Carbon Dioxide 29 21 - 31 mmol/L    Anion Gap 5 3 - 14 mmol/L    Glucose 92 70 - 105 mg/dL    Urea Nitrogen 18 7 - 25 mg/dL    Creatinine 0.66 0.60 - 1.20 mg/dL    GFR Estimate 90 >60 mL/min/[1.73_m2]    GFR Estimate If Black >90 >60 mL/min/[1.73_m2]    Calcium 9.7 8.6 - 10.3 mg/dL   CBC with platelets differential   Result Value Ref Range    WBC 5.6 4.0 - 11.0 10e9/L    RBC Count 5.23 (H) 3.8 - 5.2 10e12/L    Hemoglobin 15.2 11.7 - 15.7 g/dL    Hematocrit 46.7 35.0 - 47.0 %    MCV 89 78 - 100 fl    MCH 29.1 26.5 - 33.0 pg    MCHC 32.5 31.5 - 36.5 g/dL    RDW 12.6 10.0 - 15.0 %    Platelet Count 275 150 - 450 10e9/L    Diff Method Automated Method     % Neutrophils 61.3 %    % Lymphocytes 28.4 %    % Monocytes 8.0 %    % Eosinophils 1.4 %    % Basophils 0.5 %    % Immature Granulocytes 0.4 %    Absolute Neutrophil 3.4 1.6 - 8.3 10e9/L    Absolute Lymphocytes 1.6 0.8 - 5.3 10e9/L    Absolute Monocytes 0.5 0.0 - 1.3 10e9/L    Absolute Eosinophils 0.1 0.0 - 0.7 10e9/L    Absolute Basophils 0.0 0.0 - 0.2 10e9/L    Abs Immature Granulocytes 0.0 0 - 0.4 10e9/L       ASSESSEMENT AND PLAN:    aZkiya was seen today for pre-op exam.    Diagnoses and all orders for this visit:    Preoperative examination  -     EKG 12-lead, tracing only  -     CBC with platelets differential; Future  -     Basic metabolic  panel; Future  -     Basic metabolic panel  -     CBC with platelets differential    Mucous cyst of digit of right hand    Facial nerve palsy, secondary    Vertigo of central origin of right ear        PRE OP RECOMMENDATIONS:  Patient is on chronic pain medications no   Patient is on antiplatlet/anticoagulation medication no  Other medications that need adjustment perioperatively no    Other:  Patient was advised to call our office and the surgical services with any change in condition or new symptoms if they were to develop between today and their surgical date.  Especially any cardiopulmonary symptoms or symptoms concerning for an infection.    Should be at low risk for planned procedure.        Armani Guidry 5/20/2019

## 2019-05-29 ENCOUNTER — ANESTHESIA EVENT (OUTPATIENT)
Dept: SURGERY | Facility: OTHER | Age: 64
End: 2019-05-29
Payer: MEDICARE

## 2019-05-31 ENCOUNTER — ANESTHESIA (OUTPATIENT)
Dept: SURGERY | Facility: OTHER | Age: 64
End: 2019-05-31
Payer: MEDICARE

## 2019-05-31 ENCOUNTER — HOSPITAL ENCOUNTER (OUTPATIENT)
Facility: OTHER | Age: 64
Discharge: HOME | End: 2019-05-31
Attending: SPECIALIST | Admitting: SPECIALIST
Payer: MEDICARE

## 2019-05-31 VITALS
HEART RATE: 61 BPM | DIASTOLIC BLOOD PRESSURE: 53 MMHG | RESPIRATION RATE: 16 BRPM | OXYGEN SATURATION: 99 % | SYSTOLIC BLOOD PRESSURE: 116 MMHG | TEMPERATURE: 97.4 F

## 2019-05-31 DIAGNOSIS — M12.9 ARTHROPATHY: Primary | ICD-10-CM

## 2019-05-31 PROCEDURE — 36000050 ZZH SURGERY LEVEL 2 1ST 30 MIN: Performed by: SPECIALIST

## 2019-05-31 PROCEDURE — 25800030 ZZH RX IP 258 OP 636: Performed by: ANESTHESIOLOGY

## 2019-05-31 PROCEDURE — 25000125 ZZHC RX 250: Performed by: NURSE ANESTHETIST, CERTIFIED REGISTERED

## 2019-05-31 PROCEDURE — 27210794 ZZH OR GENERAL SUPPLY STERILE: Performed by: SPECIALIST

## 2019-05-31 PROCEDURE — 37000009 ZZH ANESTHESIA TECHNICAL FEE, EACH ADDTL 15 MIN: Performed by: SPECIALIST

## 2019-05-31 PROCEDURE — 25000128 H RX IP 250 OP 636: Performed by: SPECIALIST

## 2019-05-31 PROCEDURE — 25000128 H RX IP 250 OP 636: Performed by: ANESTHESIOLOGY

## 2019-05-31 PROCEDURE — 36000052 ZZH SURGERY LEVEL 2 EA 15 ADDTL MIN: Performed by: SPECIALIST

## 2019-05-31 PROCEDURE — 40000306 ZZH STATISTIC PRE PROC ASSESS II: Performed by: SPECIALIST

## 2019-05-31 PROCEDURE — 88304 TISSUE EXAM BY PATHOLOGIST: CPT

## 2019-05-31 PROCEDURE — 25000128 H RX IP 250 OP 636: Performed by: NURSE ANESTHETIST, CERTIFIED REGISTERED

## 2019-05-31 PROCEDURE — 37000008 ZZH ANESTHESIA TECHNICAL FEE, 1ST 30 MIN: Performed by: SPECIALIST

## 2019-05-31 PROCEDURE — 25000125 ZZHC RX 250: Performed by: SPECIALIST

## 2019-05-31 PROCEDURE — 71000027 ZZH RECOVERY PHASE 2 EACH 15 MINS: Performed by: SPECIALIST

## 2019-05-31 RX ORDER — HYDROMORPHONE HYDROCHLORIDE 1 MG/ML
.3-.5 INJECTION, SOLUTION INTRAMUSCULAR; INTRAVENOUS; SUBCUTANEOUS EVERY 10 MIN PRN
Status: DISCONTINUED | OUTPATIENT
Start: 2019-05-31 | End: 2019-05-31 | Stop reason: HOSPADM

## 2019-05-31 RX ORDER — KETOROLAC TROMETHAMINE 30 MG/ML
INJECTION, SOLUTION INTRAMUSCULAR; INTRAVENOUS PRN
Status: DISCONTINUED | OUTPATIENT
Start: 2019-05-31 | End: 2019-05-31

## 2019-05-31 RX ORDER — ONDANSETRON 2 MG/ML
4 INJECTION INTRAMUSCULAR; INTRAVENOUS EVERY 30 MIN PRN
Status: DISCONTINUED | OUTPATIENT
Start: 2019-05-31 | End: 2019-05-31 | Stop reason: HOSPADM

## 2019-05-31 RX ORDER — ALBUTEROL SULFATE 0.83 MG/ML
2.5 SOLUTION RESPIRATORY (INHALATION) EVERY 4 HOURS PRN
Status: DISCONTINUED | OUTPATIENT
Start: 2019-05-31 | End: 2019-05-31 | Stop reason: HOSPADM

## 2019-05-31 RX ORDER — HYDROCODONE BITARTRATE AND ACETAMINOPHEN 5; 325 MG/1; MG/1
1 TABLET ORAL
Status: DISCONTINUED | OUTPATIENT
Start: 2019-05-31 | End: 2019-05-31 | Stop reason: HOSPADM

## 2019-05-31 RX ORDER — NALOXONE HYDROCHLORIDE 0.4 MG/ML
.1-.4 INJECTION, SOLUTION INTRAMUSCULAR; INTRAVENOUS; SUBCUTANEOUS
Status: DISCONTINUED | OUTPATIENT
Start: 2019-05-31 | End: 2019-05-31 | Stop reason: HOSPADM

## 2019-05-31 RX ORDER — FENTANYL CITRATE 50 UG/ML
50 INJECTION, SOLUTION INTRAMUSCULAR; INTRAVENOUS
Status: COMPLETED | OUTPATIENT
Start: 2019-05-31 | End: 2019-05-31

## 2019-05-31 RX ORDER — MEPERIDINE HYDROCHLORIDE 50 MG/ML
12.5 INJECTION INTRAMUSCULAR; INTRAVENOUS; SUBCUTANEOUS
Status: DISCONTINUED | OUTPATIENT
Start: 2019-05-31 | End: 2019-05-31 | Stop reason: HOSPADM

## 2019-05-31 RX ORDER — FENTANYL CITRATE 50 UG/ML
25-50 INJECTION, SOLUTION INTRAMUSCULAR; INTRAVENOUS EVERY 5 MIN PRN
Status: DISCONTINUED | OUTPATIENT
Start: 2019-05-31 | End: 2019-05-31 | Stop reason: HOSPADM

## 2019-05-31 RX ORDER — OXYCODONE HYDROCHLORIDE 5 MG/1
5 TABLET ORAL EVERY 4 HOURS PRN
Status: DISCONTINUED | OUTPATIENT
Start: 2019-05-31 | End: 2019-05-31 | Stop reason: HOSPADM

## 2019-05-31 RX ORDER — HYDROCODONE BITARTRATE AND ACETAMINOPHEN 5; 325 MG/1; MG/1
1-2 TABLET ORAL EVERY 4 HOURS PRN
Qty: 10 TABLET | Refills: 0 | Status: SHIPPED | OUTPATIENT
Start: 2019-05-31 | End: 2019-07-22

## 2019-05-31 RX ORDER — SODIUM CHLORIDE, SODIUM LACTATE, POTASSIUM CHLORIDE, CALCIUM CHLORIDE 600; 310; 30; 20 MG/100ML; MG/100ML; MG/100ML; MG/100ML
INJECTION, SOLUTION INTRAVENOUS CONTINUOUS
Status: DISCONTINUED | OUTPATIENT
Start: 2019-05-31 | End: 2019-05-31 | Stop reason: HOSPADM

## 2019-05-31 RX ORDER — BUPIVACAINE HYDROCHLORIDE 2.5 MG/ML
INJECTION, SOLUTION EPIDURAL; INFILTRATION; INTRACAUDAL PRN
Status: DISCONTINUED | OUTPATIENT
Start: 2019-05-31 | End: 2019-05-31 | Stop reason: HOSPADM

## 2019-05-31 RX ORDER — KETAMINE HYDROCHLORIDE 50 MG/ML
INJECTION, SOLUTION INTRAMUSCULAR; INTRAVENOUS PRN
Status: DISCONTINUED | OUTPATIENT
Start: 2019-05-31 | End: 2019-05-31

## 2019-05-31 RX ORDER — DEXAMETHASONE SODIUM PHOSPHATE 4 MG/ML
4 INJECTION, SOLUTION INTRA-ARTICULAR; INTRALESIONAL; INTRAMUSCULAR; INTRAVENOUS; SOFT TISSUE EVERY 10 MIN PRN
Status: DISCONTINUED | OUTPATIENT
Start: 2019-05-31 | End: 2019-05-31 | Stop reason: HOSPADM

## 2019-05-31 RX ORDER — ONDANSETRON 4 MG/1
4 TABLET, ORALLY DISINTEGRATING ORAL EVERY 30 MIN PRN
Status: DISCONTINUED | OUTPATIENT
Start: 2019-05-31 | End: 2019-05-31 | Stop reason: HOSPADM

## 2019-05-31 RX ORDER — LIDOCAINE 40 MG/G
CREAM TOPICAL
Status: DISCONTINUED | OUTPATIENT
Start: 2019-05-31 | End: 2019-05-31 | Stop reason: HOSPADM

## 2019-05-31 RX ORDER — CEFAZOLIN SODIUM 2 G/100ML
2 INJECTION, SOLUTION INTRAVENOUS
Status: COMPLETED | OUTPATIENT
Start: 2019-05-31 | End: 2019-05-31

## 2019-05-31 RX ORDER — HYDRALAZINE HYDROCHLORIDE 20 MG/ML
2.5-5 INJECTION INTRAMUSCULAR; INTRAVENOUS EVERY 10 MIN PRN
Status: DISCONTINUED | OUTPATIENT
Start: 2019-05-31 | End: 2019-05-31 | Stop reason: HOSPADM

## 2019-05-31 RX ORDER — LIDOCAINE HYDROCHLORIDE 20 MG/ML
INJECTION, SOLUTION INFILTRATION; PERINEURAL PRN
Status: DISCONTINUED | OUTPATIENT
Start: 2019-05-31 | End: 2019-05-31

## 2019-05-31 RX ORDER — PROPOFOL 10 MG/ML
INJECTION, EMULSION INTRAVENOUS CONTINUOUS PRN
Status: DISCONTINUED | OUTPATIENT
Start: 2019-05-31 | End: 2019-05-31

## 2019-05-31 RX ORDER — ONDANSETRON 2 MG/ML
INJECTION INTRAMUSCULAR; INTRAVENOUS PRN
Status: DISCONTINUED | OUTPATIENT
Start: 2019-05-31 | End: 2019-05-31

## 2019-05-31 RX ADMIN — KETAMINE HYDROCHLORIDE 30 MG: 50 INJECTION, SOLUTION INTRAMUSCULAR; INTRAVENOUS at 08:38

## 2019-05-31 RX ADMIN — CEFAZOLIN SODIUM 2 G: 2 INJECTION, SOLUTION INTRAVENOUS at 08:38

## 2019-05-31 RX ADMIN — LIDOCAINE HYDROCHLORIDE 60 MG: 20 INJECTION, SOLUTION INFILTRATION; PERINEURAL at 08:38

## 2019-05-31 RX ADMIN — PROPOFOL 75 MCG/KG/MIN: 10 INJECTION, EMULSION INTRAVENOUS at 08:38

## 2019-05-31 RX ADMIN — MIDAZOLAM 2 MG: 1 INJECTION INTRAMUSCULAR; INTRAVENOUS at 08:38

## 2019-05-31 RX ADMIN — ONDANSETRON 4 MG: 2 INJECTION INTRAMUSCULAR; INTRAVENOUS at 08:38

## 2019-05-31 RX ADMIN — KETOROLAC TROMETHAMINE 30 MG: 30 INJECTION, SOLUTION INTRAMUSCULAR at 09:03

## 2019-05-31 RX ADMIN — FENTANYL CITRATE 50 MCG: 50 INJECTION, SOLUTION INTRAMUSCULAR; INTRAVENOUS at 08:38

## 2019-05-31 RX ADMIN — SODIUM CHLORIDE, POTASSIUM CHLORIDE, SODIUM LACTATE AND CALCIUM CHLORIDE: 600; 310; 30; 20 INJECTION, SOLUTION INTRAVENOUS at 08:13

## 2019-05-31 NOTE — DISCHARGE INSTRUCTIONS
[facility]  Adult Discharge Orders & Instructions    ________________________________________________________________          For 12 hours after surgery  1. Get plenty of rest.  A responsible adult must stay with you for at least 12 hours after you leave the hospital.   2. You may feel lightheaded.  IF so, sit for a few minutes before standing.  Have someone help you get up.   3. You may have a slight fever. Call the doctor if your fever is over 101 F (38.3 C) (taken under the tongue) or lasts longer than 24 hours.  4. You may have a dry mouth, a sore throat, muscle aches or trouble sleeping.  These should go away after 24 hours.  5. Do not make important or legal decisions.  6.   Do not drive or use heavy equipment.  If you have weakness or tingling, don't drive or use heavy equipment until this feeling goes away.    To contact a doctor, call   [phone]_______________________

## 2019-05-31 NOTE — ANESTHESIA PREPROCEDURE EVALUATION
Anesthesia Pre-Procedure Evaluation    Patient: Zakiya Hopkins   MRN: [id number] : 1955          Preoperative Diagnosis: cyst on thumb    Procedure(s):  Excision mucous cyst Right Thumb IP Joint    Past Medical History:   Diagnosis Date     Bell's palsy     ,Postoperative complication after acoustic neuroma resection     Other shoulder lesions, left shoulder     No Comments Provided     Other specified congenital malformations of intestine          Personal history of other medical treatment (CODE)     age 11     Past Surgical History:   Procedure Laterality Date     COLONOSCOPY           CRANIOTOMY MIDDLE FOSSA, EXCISE ACOUSTIC NEUROMA, COMBINED Right 2012    [facility]     HYSTERECTOMY TOTAL ABDOMINAL, BILATERAL SALPINGO-OOPHORECTOMY, COMBINED      No Comments Provided       Anesthesia Evaluation     . Pt has had prior anesthetic. Type: General and MAC           ROS/MED HX    ENT/Pulmonary:  - neg pulmonary ROS     Neurologic:  - neg neurologic ROS     Cardiovascular:  - neg cardiovascular ROS       METS/Exercise Tolerance:     Hematologic:  - neg hematologic  ROS       Musculoskeletal:  - neg musculoskeletal ROS       GI/Hepatic:  - neg GI/hepatic ROS       Renal/Genitourinary:  - ROS Renal section negative       Endo:  - neg endo ROS       Psychiatric:         Infectious Disease:  - neg infectious disease ROS       Malignancy:      - no malignancy   Other:    - neg other ROS                      Physical Exam  Normal systems: cardiovascular, pulmonary and dental    Airway   Mallampati: II  TM distance: >3 FB  Neck ROM: full    Dental     Cardiovascular       Pulmonary             Lab Results   Component Value Date    WBC 5.6 2019    HGB 15.2 2019    HCT 46.7 2019     2019     2019    POTASSIUM 4.7 2019    CHLORIDE 103 2019    CO2 29 2019    BUN 18 2019    CR 0.66 2019    GLC 92 2019    HARINDER 9.7  "05/20/2019    ALBUMIN 4.4 12/13/2017    PROTTOTAL 7.1 12/13/2017    ALKPHOS 78 12/13/2017    BILITOTAL 1.2 (H) 12/13/2017       Preop Vitals  BP Readings from Last 3 Encounters:   05/31/19 121/67   05/20/19 138/86   02/12/19 138/82    Pulse Readings from Last 3 Encounters:   05/20/19 63   02/12/19 60   03/06/18 60      Resp Readings from Last 3 Encounters:   05/31/19 16   05/20/19 16   02/12/19 16    SpO2 Readings from Last 3 Encounters:   05/31/19 99%   05/20/19 98%   10/29/15 98%      Temp Readings from Last 1 Encounters:   05/31/19 97.4  F (36.3  C) (Tympanic)    Ht Readings from Last 1 Encounters:   05/20/19 1.67 m (5' 5.75\")      Wt Readings from Last 1 Encounters:   05/20/19 60.8 kg (134 lb)    Estimated body mass index is 21.79 kg/m  as calculated from the following:    Height as of 5/20/19: 1.67 m (5' 5.75\").    Weight as of 5/20/19: 60.8 kg (134 lb).       Anesthesia Plan      History & Physical Review      ASA Status:  2 .    NPO Status:  > 6 hours    Plan for MAC Maintenance will be TIVA.           Postoperative Care  Postoperative pain management:  IV analgesics and Oral pain medications.      Consents  Anesthetic plan, risks, benefits and alternatives discussed with:  Patient.  Use of blood products discussed: No .   .                 Alban Barnett DO"

## 2019-05-31 NOTE — OR NURSING
Per patient request, she did not want Rx. Shredded and witnessed by Skylar JUSTIN RN.  Eva Prado RN on 5/31/2019 at 9:30 AM

## 2019-05-31 NOTE — ANESTHESIA CARE TRANSFER NOTE
Patient: Zakiya Hopkins    Procedure(s):  Excision mucous cyst Right Thumb IP Joint    Diagnosis: cyst on thumb  Diagnosis Additional Information: No value filed.    Anesthesia Type:   MAC     Note:  Airway :Room Air  Patient transferred to:Phase II  Handoff Report: Identifed the Patient, Identified the Reponsible Provider, Reviewed the pertinent medical history, Discussed the surgical course, Reviewed Intra-OP anesthesia mangement and issues during anesthesia, Set expectations for post-procedure period and Allowed opportunity for questions and acknowledgement of understanding      Vitals: (Last set prior to Anesthesia Care Transfer)    CRNA VITALS  5/31/2019 0837 - 5/31/2019 0913      5/31/2019             Resp Rate (set):  10                Electronically Signed By: MARILYN Paiz CRNA  May 31, 2019  9:13 AM

## 2019-05-31 NOTE — ANESTHESIA POSTPROCEDURE EVALUATION
Patient: Zakiya Hopkins    Procedure(s):  Excision mucous cyst Right Thumb IP Joint    Diagnosis:cyst on thumb  Diagnosis Additional Information: No value filed.    Anesthesia Type:  MAC    Note:  Anesthesia Post Evaluation    Patient location during evaluation: Phase 2  Patient participation: Able to fully participate in evaluation  Level of consciousness: awake and alert  Pain management: adequate  Airway patency: patent  Cardiovascular status: acceptable  Respiratory status: acceptable  Hydration status: acceptable  PONV: none     Anesthetic complications: None          Last vitals:  Vitals:    05/31/19 0915 05/31/19 0930 05/31/19 0945   BP: 110/59 124/73 116/53   Pulse: 61 60 61   Resp:  16    Temp:      SpO2: 95% 97% 99%         Electronically Signed By: Alban Barnett DO  May 31, 2019  11:15 AM

## 2019-05-31 NOTE — OP NOTE
Procedure Date: 2019      PREOPERATIVE DIAGNOSIS:  Right thumb interphalangeal joint mucous cyst.      POSTOPERATIVE DIAGNOSIS:  Right thumb interphalangeal joint mucous cyst.      PROCEDURES PERFORMED:  Excision mucous cyst with debridement of dorsal osteophyte right thumb interphalangeal joint.        SURGEON:  Vandana Liriano MD      ASSISTANT:  Prabhjot Rainey PA-C      ANESTHESIA:  Local with IV sedation.      INDICATIONS:  This is a 64-year-old female with recurrent mucous cyst of her right thumb.  She had ongoing and persistent symptoms.  She was ultimately offered surgical treatment as outlined above and elected to proceed.      DESCRIPTION OF PROCEDURE:  Following clearance, the patient was brought to the operating room, placed on the operating table.  Pneumatic tourniquet was placed about the right upper extremity.  The right upper extremity was prepped and draped in normal sterile manner.  A timeout procedure was performed confirming surgical site, patient identity and procedure.  A T-shaped incision was planned over the IP joint.  This was infiltrated with local anesthetic without epinephrine.  The incision was made, carried sharply down through skin and subcutaneous tissue.  Careful spreading was performed.  Mucous site was identified.  The mucous cyst was identified and excised in total.  A vertical incision was then made through the extensor mechanism and rongeur was used to debride the osteophyte.  Once completed, the area was irrigated.  The rent in the extensor mechanism was repaired with a 3-0 Vicryl.  The skin was closed with nylon suture.  A sterile dressing applied.  The patient was brought to the recovery room in stable condition.         VANDANA LIRIANO MD             D: 2019   T: 2019   MT: PRADEEP      Name:     SHLOMO RODRIGUEZ   MRN:      [id number]        Account:        [id number]   :      1955           Procedure Date: 2019      Document: [id number]

## 2019-05-31 NOTE — BRIEF OP NOTE
[facility]    Brief Operative Note    Pre-operative diagnosis: cyst on thumb  Post-operative diagnosis mucus cyst right thumb ip joint  Procedure: Procedure(s):  Excision mucous cyst Right Thumb IP Joint  Surgeon: Surgeon(s) and Role:     * John Chaparro MD - Primary     * Prabhjot Rainey PA - Assisting  Anesthesia: mac   Estimated blood loss: None  Drains: None  Specimens:   ID Type Source Tests Collected by Time Destination   A : RIGHT HAND MUCUS CYST Cyst Hand, Right SURGICAL PATHOLOGY EXAM John Chaparro MD 5/31/2019  8:56 AM      Findings:   None.  Complications: None.

## 2019-06-07 ENCOUNTER — OFFICE VISIT (OUTPATIENT)
Dept: ORTHOPEDICS | Facility: OTHER | Age: 64
End: 2019-06-07
Attending: SPECIALIST
Payer: MEDICARE

## 2019-06-07 DIAGNOSIS — Z00.00 ROUTINE GENERAL MEDICAL EXAMINATION AT A HEALTH CARE FACILITY: Primary | ICD-10-CM

## 2019-06-07 PROCEDURE — G0463 HOSPITAL OUTPT CLINIC VISIT: HCPCS

## 2019-06-07 PROCEDURE — 99024 POSTOP FOLLOW-UP VISIT: CPT | Performed by: SPECIALIST

## 2019-06-11 NOTE — PROGRESS NOTES
CHIEF COMPLAINT: Right Thumb Cyst Excision Postop    PROBLEMS:   Patient has no noted problems.    PATIENT REPORTED MEDICATIONS:  * OTC ANALGESICS   CYCLOBENZAPRINE HCL TABLET (CYCLOBENZAPRINE HCL TABS)   AMBIEN TABLET (ZOLPIDEM TARTRATE TABS)     PATIENT REPORTED ALLERGIES:  * ERYTHROMYCIN EYE OINTMENT (SevereReaction: Unknown Reaction)      HISTORY OF PRESENT ILLNESS:    Zakiya returns for followup status post right thumb IP joint mucous cyst excision.  Her date of surgery was 05/31/19.   She is back today doing well.    PAST MEDICAL HISTORY:    Arthritis    PAST ORTHOPEDIC SURGICAL HISTORY:    Right Thumb IP Joint Mucous Cyst Excision, Debridement Dorsal Osteophyte 05/31/19  Right Wrist Ganglion Cyst Removal    PAST SURGICAL HISTORY:    SAPPHIRE BSO  Acoustic Neuroma 2012    FAMILY HISTORY:    Father:  Heart Issue, Stroke, Diabetes  Mother:  Alcoholism, Dementia  Brother:  Alcoholism, Arthritis  Sister:  Cancer    SOCIAL HISTORY:   Marital Status:    Occupation:  Retired RN  Alcohol Use:  Yes  Tobacco Use:  Never  Secondhand Smoke Exposure:  Yes, as child  History HIV:  No  History Hepatitis:  No    PHYSICAL EXAMINATION:    Physical examination today reveals her incision to be healing nicely.   There is no evidence of infection.     ASSESSMENT:    One Week Status Post Right Thumb IP Joint Mucous Cyst Excision, Debridement Dorsal Osteophyte 05/31/19    PLAN:   At this point I think it is a bit early to remove sutures.  Our plan will be to see her back next week for suture removal.  We will advance her activities in the interim.    Dictated by John Chaparro M.D.  D:  06/07/19  T:   06/11/19    Copy to:  Armani Guidry MD     Typed and/or reviewed and corrected by signing  below, and sent to the Physician for final review and signature.     This report was created using voice recording software and computer-generated templates. Although every effort has been made to review for and  eliminate errors, some errors may still occur.         Electronically signed by Rachell East  on 06/11/2019 at 9:24 AM    Electronically signed by John Chaparro MD on 06/11/2019 at 10:21 AM  ________________________________________________________________________

## 2019-06-14 ENCOUNTER — OFFICE VISIT (OUTPATIENT)
Dept: ORTHOPEDICS | Facility: OTHER | Age: 64
End: 2019-06-14
Attending: SPECIALIST
Payer: MEDICARE

## 2019-06-14 DIAGNOSIS — Z00.00 ROUTINE GENERAL MEDICAL EXAMINATION AT A HEALTH CARE FACILITY: Primary | ICD-10-CM

## 2019-06-14 PROCEDURE — G0463 HOSPITAL OUTPT CLINIC VISIT: HCPCS

## 2019-06-14 PROCEDURE — 99024 POSTOP FOLLOW-UP VISIT: CPT | Performed by: SPECIALIST

## 2019-06-19 NOTE — PROGRESS NOTES
CHIEF COMPLAINT: Right Thumb Cyst Excision Recheck    PROBLEMS:   Patient has no noted problems.    PATIENT REPORTED MEDICATIONS:  * OTC ANALGESICS   CYCLOBENZAPRINE HCL TABLET (CYCLOBENZAPRINE HCL TABS)   AMBIEN TABLET (ZOLPIDEM TARTRATE TABS)     PATIENT REPORTED ALLERGIES:  * ERYTHROMYCIN EYE OINTMENT (SevereReaction: Unknown Reaction)      HISTORY OF PRESENT ILLNESS:    Zakiya returns for followup status post right thumb inclusion cyst excision.  Her date of surgery was 5/31/19.  She is back today, doing well.     PAST MEDICAL HISTORY:    Arthritis    PAST ORTHOPEDIC SURGICAL HISTORY:    Right Thumb IP Joint Mucous Cyst Excision, Debridement Dorsal Osteophyte 05/31/19  Right Wrist Ganglion Cyst Removal    PAST SURGICAL HISTORY:    SAPPHIRE BSO  Acoustic Neuroma 2012    FAMILY HISTORY:    Father:  Heart Issue, Stroke, Diabetes  Mother:  Alcoholism, Dementia  Brother:  Alcoholism, Arthritis  Sister:  Cancer    SOCIAL HISTORY:   Marital Status:    Occupation:  Retired RN  Alcohol Use:  Yes  Tobacco Use:  Never  Secondhand Smoke Exposure:  Yes, as child  History HIV:  No  History Hepatitis:  No    PHYSICAL EXAMINATION:    Physical examination today confirms her right thumb incision to be healing nicely.     ASSESSMENT:    Status post right thumb inclusion cyst excision 5/31/19.    PLAN:   At this point I congratulated her on her progress.  Will advance her activities.  Will see her back as symptoms warrant it.     Dictated by John Chaparro MD     D:  06/14/19  T:  06/18/19     Typed and/or reviewed and corrected by signing  below, and sent to the Physician for final review and signature.     This report was created using voice recording software and computer-generated templates. Although every effort has been made to review for and eliminate errors, some errors may still occur.         Electronically signed by Merary East on 06/18/2019 at 10:05 AM    Electronically signed by  John Chaparro MD on 06/18/2019 at 10:11 AM

## 2019-07-08 ENCOUNTER — OFFICE VISIT (OUTPATIENT)
Dept: FAMILY MEDICINE | Facility: OTHER | Age: 64
End: 2019-07-08
Attending: NURSE PRACTITIONER
Payer: MEDICARE

## 2019-07-08 VITALS
WEIGHT: 131 LBS | HEART RATE: 76 BPM | DIASTOLIC BLOOD PRESSURE: 80 MMHG | SYSTOLIC BLOOD PRESSURE: 132 MMHG | TEMPERATURE: 97.3 F | BODY MASS INDEX: 21.05 KG/M2 | HEIGHT: 66 IN | RESPIRATION RATE: 18 BRPM

## 2019-07-08 DIAGNOSIS — Z12.11 SCREEN FOR COLON CANCER: ICD-10-CM

## 2019-07-08 DIAGNOSIS — R19.7 DIARRHEA, UNSPECIFIED TYPE: Primary | ICD-10-CM

## 2019-07-08 LAB
ALBUMIN SERPL-MCNC: 4.7 G/DL (ref 3.5–5.7)
ALP SERPL-CCNC: 72 U/L (ref 34–104)
ALT SERPL W P-5'-P-CCNC: 14 U/L (ref 7–52)
ANION GAP SERPL CALCULATED.3IONS-SCNC: 6 MMOL/L (ref 3–14)
AST SERPL W P-5'-P-CCNC: 17 U/L (ref 13–39)
BASOPHILS # BLD AUTO: 0 10E9/L (ref 0–0.2)
BASOPHILS NFR BLD AUTO: 0.5 %
BILIRUB SERPL-MCNC: 1.1 MG/DL (ref 0.3–1)
BUN SERPL-MCNC: 14 MG/DL (ref 7–25)
CALCIUM SERPL-MCNC: 9.9 MG/DL (ref 8.6–10.3)
CHLORIDE SERPL-SCNC: 105 MMOL/L (ref 98–107)
CO2 SERPL-SCNC: 29 MMOL/L (ref 21–31)
CREAT SERPL-MCNC: 0.72 MG/DL (ref 0.6–1.2)
DIFFERENTIAL METHOD BLD: ABNORMAL
EOSINOPHIL # BLD AUTO: 0 10E9/L (ref 0–0.7)
EOSINOPHIL NFR BLD AUTO: 0.7 %
ERYTHROCYTE [DISTWIDTH] IN BLOOD BY AUTOMATED COUNT: 12.4 % (ref 10–15)
GFR SERPL CREATININE-BSD FRML MDRD: 81 ML/MIN/{1.73_M2}
GLUCOSE SERPL-MCNC: 106 MG/DL (ref 70–105)
HCT VFR BLD AUTO: 46.7 % (ref 35–47)
HGB BLD-MCNC: 15.1 G/DL (ref 11.7–15.7)
IMM GRANULOCYTES # BLD: 0 10E9/L (ref 0–0.4)
IMM GRANULOCYTES NFR BLD: 0.2 %
LYMPHOCYTES # BLD AUTO: 1.6 10E9/L (ref 0.8–5.3)
LYMPHOCYTES NFR BLD AUTO: 25.7 %
MCH RBC QN AUTO: 28.6 PG (ref 26.5–33)
MCHC RBC AUTO-ENTMCNC: 32.3 G/DL (ref 31.5–36.5)
MCV RBC AUTO: 88 FL (ref 78–100)
MONOCYTES # BLD AUTO: 0.4 10E9/L (ref 0–1.3)
MONOCYTES NFR BLD AUTO: 6.3 %
NEUTROPHILS # BLD AUTO: 4.1 10E9/L (ref 1.6–8.3)
NEUTROPHILS NFR BLD AUTO: 66.6 %
PLATELET # BLD AUTO: 263 10E9/L (ref 150–450)
POTASSIUM SERPL-SCNC: 4.1 MMOL/L (ref 3.5–5.1)
PROT SERPL-MCNC: 7.2 G/DL (ref 6.4–8.9)
RBC # BLD AUTO: 5.28 10E12/L (ref 3.8–5.2)
SODIUM SERPL-SCNC: 140 MMOL/L (ref 134–144)
WBC # BLD AUTO: 6.1 10E9/L (ref 4–11)

## 2019-07-08 PROCEDURE — G0463 HOSPITAL OUTPT CLINIC VISIT: HCPCS

## 2019-07-08 PROCEDURE — 80053 COMPREHEN METABOLIC PANEL: CPT | Mod: ZL | Performed by: NURSE PRACTITIONER

## 2019-07-08 PROCEDURE — 85025 COMPLETE CBC W/AUTO DIFF WBC: CPT | Mod: ZL | Performed by: NURSE PRACTITIONER

## 2019-07-08 PROCEDURE — 36415 COLL VENOUS BLD VENIPUNCTURE: CPT | Mod: ZL | Performed by: NURSE PRACTITIONER

## 2019-07-08 PROCEDURE — 99213 OFFICE O/P EST LOW 20 MIN: CPT | Performed by: NURSE PRACTITIONER

## 2019-07-08 ASSESSMENT — ENCOUNTER SYMPTOMS
HEMATOCHEZIA: 0
ABDOMINAL DISTENTION: 0
NAUSEA: 0
VOMITING: 0
ABDOMINAL PAIN: 0
DIARRHEA: 1
CONSTIPATION: 0

## 2019-07-08 ASSESSMENT — PAIN SCALES - GENERAL: PAINLEVEL: NO PAIN (0)

## 2019-07-08 ASSESSMENT — MIFFLIN-ST. JEOR: SCORE: 1146.99

## 2019-07-08 NOTE — PROGRESS NOTES
o  SUBJECTIVE:   Zakiya Hopkins is a 64 year old female who presents to clinic today for the following health issues:    HPI  Patient presents for evaluation of watery stool once a day for the last three weeks. She notes one explosive stool in the morning. She has no other accompanying symptoms of nausea, vomiting, fevers or abdominal pain. Denies black or bloody stools. She takes no new medications. No new foods. She started trying bland foods to help with symptoms, but that does not seem effective. She reports prior to diarrhea she was constipated. She denies laxative use. She tells me she did complete a trial of ciprofloxacin for treatment for acute diarrheal infection and saw no improvement in her symptoms.No recent travel. No known sick exposures. She is a retired nurse.     Patient Active Problem List    Diagnosis Date Noted     Lesion of plantar nerve 01/26/2018     Priority: Medium     Insomnia 10/20/2015     Priority: Medium     Facial nerve palsy, secondary 09/10/2015     Priority: Medium     Vertigo of central origin 11/03/2012     Priority: Medium     C6 radiculopathy 11/01/2012     Priority: Medium     Acoustic neuroma (H) 07/17/2012     Priority: Medium     Arthropathy 10/04/2011     Priority: Medium     Past Medical History:   Diagnosis Date     Bell's palsy     8/12,Postoperative complication after acoustic neuroma resection     Other shoulder lesions, left shoulder     No Comments Provided     Other specified congenital malformations of intestine     04/05     Personal history of other medical treatment (CODE)     age 11      Past Surgical History:   Procedure Laterality Date     COLONOSCOPY      04/05     CRANIOTOMY MIDDLE FOSSA, EXCISE ACOUSTIC NEUROMA, COMBINED Right 08/2012    [facility]     EXCISE MASS FINGER Right 5/31/2019    Procedure: Excision mucous cyst Right Thumb IP Joint;  Surgeon: John Chaparro MD;  Location: [facility] OR     HYSTERECTOMY TOTAL ABDOMINAL, BILATERAL  "SALPINGO-OOPHORECTOMY, COMBINED      No Comments Provided       Review of Systems   Gastrointestinal: Positive for diarrhea. Negative for abdominal distention, abdominal pain, constipation, hematochezia, nausea and vomiting.        OBJECTIVE:     /80 (BP Location: Right arm, Patient Position: Sitting, Cuff Size: Adult Regular)   Pulse 76   Temp 97.3  F (36.3  C) (Tympanic)   Resp 18   Ht 1.67 m (5' 5.75\")   Wt 59.4 kg (131 lb)   Breastfeeding? No   BMI 21.31 kg/m    Body mass index is 21.31 kg/m .  Physical Exam   Constitutional: She appears well-developed and well-nourished. No distress.   Abdominal: Soft. Bowel sounds are normal. She exhibits no distension and no mass. There is no tenderness. There is no guarding.     Diagnostic Test Results:  Results for orders placed or performed in visit on 07/08/19 (from the past 24 hour(s))   Comprehensive Metabolic Panel   Result Value Ref Range    Sodium 140 134 - 144 mmol/L    Potassium 4.1 3.5 - 5.1 mmol/L    Chloride 105 98 - 107 mmol/L    Carbon Dioxide 29 21 - 31 mmol/L    Anion Gap 6 3 - 14 mmol/L    Glucose 106 (H) 70 - 105 mg/dL    Urea Nitrogen 14 7 - 25 mg/dL    Creatinine 0.72 0.60 - 1.20 mg/dL    GFR Estimate 81 >60 mL/min/[1.73_m2]    GFR Estimate If Black >90 >60 mL/min/[1.73_m2]    Calcium 9.9 8.6 - 10.3 mg/dL    Bilirubin Total 1.1 (H) 0.3 - 1.0 mg/dL    Albumin 4.7 3.5 - 5.7 g/dL    Protein Total 7.2 6.4 - 8.9 g/dL    Alkaline Phosphatase 72 34 - 104 U/L    ALT 14 7 - 52 U/L    AST 17 13 - 39 U/L   CBC and Differential   Result Value Ref Range    WBC 6.1 4.0 - 11.0 10e9/L    RBC Count 5.28 (H) 3.8 - 5.2 10e12/L    Hemoglobin 15.1 11.7 - 15.7 g/dL    Hematocrit 46.7 35.0 - 47.0 %    MCV 88 78 - 100 fl    MCH 28.6 26.5 - 33.0 pg    MCHC 32.3 31.5 - 36.5 g/dL    RDW 12.4 10.0 - 15.0 %    Platelet Count 263 150 - 450 10e9/L    Diff Method Automated Method     % Neutrophils 66.6 %    % Lymphocytes 25.7 %    % Monocytes 6.3 %    % Eosinophils 0.7 % "    % Basophils 0.5 %    % Immature Granulocytes 0.2 %    Absolute Neutrophil 4.1 1.6 - 8.3 10e9/L    Absolute Lymphocytes 1.6 0.8 - 5.3 10e9/L    Absolute Monocytes 0.4 0.0 - 1.3 10e9/L    Absolute Eosinophils 0.0 0.0 - 0.7 10e9/L    Absolute Basophils 0.0 0.0 - 0.2 10e9/L    Abs Immature Granulocytes 0.0 0 - 0.4 10e9/L   Stool culture: Pending     ASSESSMENT/PLAN:   1. Diarrhea, unspecified type  This does not seem infectious in origin. CBC/CMP WNL. This could be dietary related, but unusual that it has not changed with dietary changes or happens only once a day. We will complete stool culture for further investigation. Her last colonoscopy was >10 years ago. She is willing to complete screening at this time and will likely give us more information with regards for cause of diarrhea. We will wait for stool culture. If worsening symptoms develop she should be seen sooner.   - CBC and Differential; Future  - Comprehensive Metabolic Panel; Future  - Stool culture; Future  - Comprehensive Metabolic Panel  - CBC and Differential    2. Screen for colon cancer  She is due for screening. Agrees to complete evaluation.     Caty Yap [facility]

## 2019-07-08 NOTE — NURSING NOTE
Patient presents to clinic experiencing runny stools/diarrhea in the mornings for past 3 weeks.   She denies any nausea, emesis or abdominal pain.    Medication Reconciliation: complete    Dianna Bradshaw LPN

## 2019-07-09 ENCOUNTER — TELEPHONE (OUTPATIENT)
Dept: SURGERY | Facility: OTHER | Age: 64
End: 2019-07-09

## 2019-07-09 DIAGNOSIS — R19.7 DIARRHEA, UNSPECIFIED TYPE: Primary | ICD-10-CM

## 2019-07-09 DIAGNOSIS — R19.7 DIARRHEA, UNSPECIFIED TYPE: ICD-10-CM

## 2019-07-09 PROCEDURE — 87493 C DIFF AMPLIFIED PROBE: CPT | Mod: ZL | Performed by: NURSE PRACTITIONER

## 2019-07-09 PROCEDURE — 87046 STOOL CULTR AEROBIC BACT EA: CPT | Performed by: NURSE PRACTITIONER

## 2019-07-09 PROCEDURE — 87899 AGENT NOS ASSAY W/OPTIC: CPT | Performed by: NURSE PRACTITIONER

## 2019-07-09 PROCEDURE — 87046 STOOL CULTR AEROBIC BACT EA: CPT | Mod: 91 | Performed by: NURSE PRACTITIONER

## 2019-07-09 PROCEDURE — 87045 FECES CULTURE AEROBIC BACT: CPT | Performed by: NURSE PRACTITIONER

## 2019-07-09 RX ORDER — POLYETHYLENE GLYCOL 3350, SODIUM CHLORIDE, SODIUM BICARBONATE, POTASSIUM CHLORIDE 420; 11.2; 5.72; 1.48 G/4L; G/4L; G/4L; G/4L
4000 POWDER, FOR SOLUTION ORAL ONCE
Qty: 4000 ML | Refills: 0 | Status: ON HOLD | OUTPATIENT
Start: 2019-07-09 | End: 2019-07-29

## 2019-07-09 RX ORDER — BISACODYL 5 MG/1
TABLET, DELAYED RELEASE ORAL
Qty: 2 TABLET | Refills: 0 | Status: ON HOLD | OUTPATIENT
Start: 2019-07-09 | End: 2019-07-29

## 2019-07-09 NOTE — TELEPHONE ENCOUNTER
Patient referred by Caty Yap for a diagnostic colonoscopy,  Diagnosis is diarrhea.   Please advise. Thank you. Melba Mathis on 7/9/2019 at 10:06 AM

## 2019-07-09 NOTE — TELEPHONE ENCOUNTER
Screening Questions for the Scheduling of Screening Colonoscopies   (If Colonoscopy is diagnostic, Provider should review the chart before scheduling.)  Are you younger than 50 or older than 80?  NO   Do you take aspirin or fish oil?  NO  (if yes, tell patient to stop 1 week prior to Colonoscopy)  Do you take warfarin (Coumadin), clopidogrel (Plavix), apixaban (Eliquis), dabigatram (Pradaxa), rivaroxaban (Xarelto) or any blood thinner? NO   Do you use oxygen at home?  NO   Do you have kidney disease? NO   Are you on dialysis? NO   Have you had a stroke or heart attack in the last year? NO   Have you had a stent in your heart or any blood vessel in the last year? NO   Have you had a transplant of any organ? NO   Have you had a colonoscopy or upper endoscopy (EGD) before? YES          When?  OVER 10 YRS   Date of scheduled Colonoscopy. 07/29/2019  Provider [facility]   Pharmacy  [facility]

## 2019-07-10 DIAGNOSIS — R19.7 DIARRHEA, UNSPECIFIED TYPE: Primary | ICD-10-CM

## 2019-07-10 LAB
C DIFF TOX B STL QL: NEGATIVE
SPECIMEN SOURCE: NORMAL

## 2019-07-11 LAB
BACTERIA SPEC CULT: NORMAL
BACTERIA SPEC CULT: NORMAL
E COLI SXT1+2 STL IA: NORMAL
E COLI SXT1+2 STL IA: NORMAL
SPECIMEN SOURCE: NORMAL

## 2019-07-29 ENCOUNTER — HOSPITAL ENCOUNTER (OUTPATIENT)
Facility: OTHER | Age: 64
Discharge: HOME | End: 2019-07-29
Attending: SURGERY | Admitting: SURGERY
Payer: MEDICARE

## 2019-07-29 ENCOUNTER — ANESTHESIA EVENT (OUTPATIENT)
Dept: SURGERY | Facility: OTHER | Age: 64
End: 2019-07-29
Payer: MEDICARE

## 2019-07-29 ENCOUNTER — ANESTHESIA (OUTPATIENT)
Dept: SURGERY | Facility: OTHER | Age: 64
End: 2019-07-29
Payer: MEDICARE

## 2019-07-29 VITALS
TEMPERATURE: 97.9 F | SYSTOLIC BLOOD PRESSURE: 118 MMHG | RESPIRATION RATE: 16 BRPM | OXYGEN SATURATION: 97 % | DIASTOLIC BLOOD PRESSURE: 55 MMHG | HEART RATE: 58 BPM | BODY MASS INDEX: 21.05 KG/M2 | HEIGHT: 66 IN | WEIGHT: 131 LBS

## 2019-07-29 PROBLEM — K63.5 COLON POLYPS: Status: ACTIVE | Noted: 2019-07-29

## 2019-07-29 PROBLEM — K57.30 SIGMOID DIVERTICULOSIS: Status: ACTIVE | Noted: 2019-07-29

## 2019-07-29 PROCEDURE — 45380 COLONOSCOPY AND BIOPSY: CPT | Performed by: SURGERY

## 2019-07-29 PROCEDURE — 40000010 ZZH STATISTIC ANES STAT CODE-CRNA PER MINUTE: Performed by: SURGERY

## 2019-07-29 PROCEDURE — 88305 TISSUE EXAM BY PATHOLOGIST: CPT

## 2019-07-29 PROCEDURE — 25000125 ZZHC RX 250: Performed by: NURSE ANESTHETIST, CERTIFIED REGISTERED

## 2019-07-29 PROCEDURE — 25000128 H RX IP 250 OP 636: Performed by: NURSE ANESTHETIST, CERTIFIED REGISTERED

## 2019-07-29 PROCEDURE — 25800030 ZZH RX IP 258 OP 636: Performed by: SURGERY

## 2019-07-29 PROCEDURE — 25000132 ZZH RX MED GY IP 250 OP 250 PS 637: Mod: GY | Performed by: SURGERY

## 2019-07-29 PROCEDURE — 25000125 ZZHC RX 250: Performed by: SURGERY

## 2019-07-29 PROCEDURE — 45380 COLONOSCOPY AND BIOPSY: CPT | Performed by: NURSE ANESTHETIST, CERTIFIED REGISTERED

## 2019-07-29 RX ORDER — PROPOFOL 10 MG/ML
INJECTION, EMULSION INTRAVENOUS CONTINUOUS PRN
Status: DISCONTINUED | OUTPATIENT
Start: 2019-07-29 | End: 2019-07-29

## 2019-07-29 RX ORDER — PROPOFOL 10 MG/ML
INJECTION, EMULSION INTRAVENOUS PRN
Status: DISCONTINUED | OUTPATIENT
Start: 2019-07-29 | End: 2019-07-29

## 2019-07-29 RX ORDER — ONDANSETRON 2 MG/ML
4 INJECTION INTRAMUSCULAR; INTRAVENOUS
Status: DISCONTINUED | OUTPATIENT
Start: 2019-07-29 | End: 2019-07-29 | Stop reason: HOSPADM

## 2019-07-29 RX ORDER — SIMETHICONE 40MG/0.6ML
SUSPENSION, DROPS(FINAL DOSAGE FORM)(ML) ORAL PRN
Status: DISCONTINUED | OUTPATIENT
Start: 2019-07-29 | End: 2019-07-29 | Stop reason: HOSPADM

## 2019-07-29 RX ORDER — LIDOCAINE 40 MG/G
CREAM TOPICAL
Status: DISCONTINUED | OUTPATIENT
Start: 2019-07-29 | End: 2019-07-29 | Stop reason: HOSPADM

## 2019-07-29 RX ORDER — SODIUM CHLORIDE, SODIUM LACTATE, POTASSIUM CHLORIDE, CALCIUM CHLORIDE 600; 310; 30; 20 MG/100ML; MG/100ML; MG/100ML; MG/100ML
INJECTION, SOLUTION INTRAVENOUS CONTINUOUS
Status: DISCONTINUED | OUTPATIENT
Start: 2019-07-29 | End: 2019-07-29 | Stop reason: HOSPADM

## 2019-07-29 RX ORDER — LIDOCAINE HYDROCHLORIDE 20 MG/ML
INJECTION, SOLUTION INFILTRATION; PERINEURAL PRN
Status: DISCONTINUED | OUTPATIENT
Start: 2019-07-29 | End: 2019-07-29

## 2019-07-29 RX ORDER — FLUMAZENIL 0.1 MG/ML
0.2 INJECTION, SOLUTION INTRAVENOUS
Status: DISCONTINUED | OUTPATIENT
Start: 2019-07-29 | End: 2019-07-29 | Stop reason: HOSPADM

## 2019-07-29 RX ORDER — ZOLPIDEM TARTRATE 5 MG/1
2.5 TABLET ORAL
COMMUNITY
End: 2020-06-19

## 2019-07-29 RX ORDER — NALOXONE HYDROCHLORIDE 0.4 MG/ML
.1-.4 INJECTION, SOLUTION INTRAMUSCULAR; INTRAVENOUS; SUBCUTANEOUS
Status: DISCONTINUED | OUTPATIENT
Start: 2019-07-29 | End: 2019-07-29 | Stop reason: HOSPADM

## 2019-07-29 RX ADMIN — PROPOFOL 100 MG: 10 INJECTION, EMULSION INTRAVENOUS at 09:18

## 2019-07-29 RX ADMIN — SODIUM CHLORIDE, POTASSIUM CHLORIDE, SODIUM LACTATE AND CALCIUM CHLORIDE: 600; 310; 30; 20 INJECTION, SOLUTION INTRAVENOUS at 09:01

## 2019-07-29 RX ADMIN — PROPOFOL 30 MG: 10 INJECTION, EMULSION INTRAVENOUS at 09:23

## 2019-07-29 RX ADMIN — LIDOCAINE HYDROCHLORIDE 80 MG: 20 INJECTION, SOLUTION INFILTRATION; PERINEURAL at 09:18

## 2019-07-29 RX ADMIN — PROPOFOL 140 MCG/KG/MIN: 10 INJECTION, EMULSION INTRAVENOUS at 09:18

## 2019-07-29 ASSESSMENT — MIFFLIN-ST. JEOR: SCORE: 1146.99

## 2019-07-29 NOTE — OP NOTE
PROCEDURE NOTE    DATE OF SERVICE: 7/29/2019    SURGEON: Jhoan Shaver MD    PRE-OP DIAGNOSIS:    Diarrhea      POST-OP DIAGNOSIS:  Same  Sigmoid Diverticulosis  Polyps at 20 cm    PROCEDURE:   Colonoscopy with random biopsies      ANESTHESIA:  ROLANDO Godoy CRNA    INDICATION FOR THE PROCEDURE: The patient is a 64 year old female with persistent diarrhea and negative work-up . The patient has no other complaints  . After explaining the risks to include bleeding, perforation, potential inability toreach the cecum, the patient wished to proceed.    PROCEDURE:After adequate sedation, the patient was in the left lateral decubitus position.  Rectal exam was performed.  There was normal tone and no palpable masses .  The colonoscope was introduced into the rectum and advanced to the cecum with Mild difficulty.  The patient's prep was good.  The terminal cecum was reached.  The TI,  cecum, ascending, transverse, descending and sigmoid colon was with sigmoid diverticulosis and two diminutive polyps at 20 cm which were biopsied and removed. Random biopsies taken from TI, right, left and rectum .  The scope was retroflexed in the rectum.  The rectum was unremarkable  .  The scope was straightened and removed.  The patient tolerated the procedure well.     ESTIMATED BLOOD LOSS: none    COMPLICATIONS:  None    TISSUE REMOVED:  Yes    RECOMMEND:      Follow-up pending pathology  Fiber  Given literature on diverticulosis    Jhoan Shaver MD FACS

## 2019-07-29 NOTE — OR NURSING
Melly  has been discharged to home at 1045 via ambulation accompanied by spouse    Written discharge instructions were provided to Melly  Prescriptions were none.      Patient and adult caring for them verbalize understanding of discharge instructions including no driving until tomorrow and no longer taking narcotic pain medications - no operating mechanical equipment and no making any important decisions.They understand reason for discharge, and necessary follow-up appointments.

## 2019-07-29 NOTE — DISCHARGE INSTRUCTIONS
[facility]  Adult Discharge Orders & Instructions    ________________________________________________________________          For 12 hours after surgery  1. Get plenty of rest.  A responsible adult must stay with you for at least 12 hours after you leave the hospital.   2. You may feel lightheaded.  IF so, sit for a few minutes before standing.  Have someone help you get up.   3. You may have a slight fever. Call the doctor if your fever is over 101 F (38.3 C) (taken under the tongue) or lasts longer than 24 hours.  4. You may have a dry mouth, a sore throat, muscle aches or trouble sleeping.  These should go away after 24 hours.  5. Do not make important or legal decisions.  6.   Do not drive or use heavy equipment.  If you have weakness or tingling, don't drive or use heavy equipment until this feeling goes away.    To contact a doctor, call   [phone]_______________________

## 2019-07-29 NOTE — ANESTHESIA CARE TRANSFER NOTE
Patient: Zakiya Hopkins    Procedure(s):  COLONOSCOPY, WITH POLYPECTOMY AND BIOPSY    Diagnosis: diarrhea  Diagnosis Additional Information: No value filed.    Anesthesia Type:   MAC     Note:  Airway :Nasal Cannula  Patient transferred to:Phase II  Handoff Report: Identifed the Patient, Identified the Reponsible Provider, Reviewed the pertinent medical history, Discussed the surgical course, Reviewed Intra-OP anesthesia mangement and issues during anesthesia, Set expectations for post-procedure period and Allowed opportunity for questions and acknowledgement of understanding      Vitals: (Last set prior to Anesthesia Care Transfer)    CRNA VITALS  7/29/2019 0913 - 7/29/2019 0946      7/29/2019             Resp Rate (set):  10                Electronically Signed By: MARILYN NAVAS CRNA  July 29, 2019  9:46 AM

## 2019-07-29 NOTE — ANESTHESIA PREPROCEDURE EVALUATION
Anesthesia Pre-Procedure Evaluation    Patient: Zakiya Hopkins   MRN: [id number] : 1955          Preoperative Diagnosis: diarrhea    Procedure(s):  COLONOSCOPY    Past Medical History:   Diagnosis Date     Bell's palsy     ,Postoperative complication after acoustic neuroma resection     Other shoulder lesions, left shoulder     No Comments Provided     Other specified congenital malformations of intestine          Personal history of other medical treatment (CODE)     age 11     Past Surgical History:   Procedure Laterality Date     COLONOSCOPY           CRANIOTOMY MIDDLE FOSSA, EXCISE ACOUSTIC NEUROMA, COMBINED Right 2012    [facility]     EXCISE MASS FINGER Right 2019    Procedure: Excision mucous cyst Right Thumb IP Joint;  Surgeon: John Chaparro MD;  Location: [facility] OR     HYSTERECTOMY TOTAL ABDOMINAL, BILATERAL SALPINGO-OOPHORECTOMY, COMBINED      No Comments Provided       Anesthesia Evaluation     . Pt has had prior anesthetic.     No history of anesthetic complications          ROS/MED HX    ENT/Pulmonary:  - neg pulmonary ROS     Neurologic:     (+)other neuro Acoustic neuroma, facial nerve palsy, vertigo    Cardiovascular:  - neg cardiovascular ROS       METS/Exercise Tolerance:     Hematologic:  - neg hematologic  ROS       Musculoskeletal:   (+)  other musculoskeletal- C6 radiculopathy      GI/Hepatic:     (+) bowel prep, Other GI/Hepatic diarrhea      Renal/Genitourinary:  - ROS Renal section negative       Endo:  - neg endo ROS       Psychiatric:  - neg psychiatric ROS       Infectious Disease:  - neg infectious disease ROS       Malignancy:      - no malignancy   Other:    - neg other ROS                      Physical Exam  Normal systems: pulmonary and dental    Airway   Mallampati: I  TM distance: >3 FB  Neck ROM: full    Dental     Cardiovascular   Rhythm and rate: regular and normal      Pulmonary    breath sounds clear to auscultation            Lab  "Results   Component Value Date    WBC 6.1 07/08/2019    HGB 15.1 07/08/2019    HCT 46.7 07/08/2019     07/08/2019     07/08/2019    POTASSIUM 4.1 07/08/2019    CHLORIDE 105 07/08/2019    CO2 29 07/08/2019    BUN 14 07/08/2019    CR 0.72 07/08/2019     (H) 07/08/2019    HARINDER 9.9 07/08/2019    ALBUMIN 4.7 07/08/2019    PROTTOTAL 7.2 07/08/2019    ALT 14 07/08/2019    AST 17 07/08/2019    ALKPHOS 72 07/08/2019    BILITOTAL 1.1 (H) 07/08/2019       Preop Vitals  BP Readings from Last 3 Encounters:   07/08/19 132/80   05/31/19 116/53   05/20/19 138/86    Pulse Readings from Last 3 Encounters:   07/08/19 76   05/31/19 61   05/20/19 63      Resp Readings from Last 3 Encounters:   07/08/19 18   05/31/19 16   05/20/19 16    SpO2 Readings from Last 3 Encounters:   05/31/19 99%   05/20/19 98%   10/29/15 98%      Temp Readings from Last 1 Encounters:   07/08/19 97.3  F (36.3  C) (Tympanic)    Ht Readings from Last 1 Encounters:   07/08/19 1.67 m (5' 5.75\")      Wt Readings from Last 1 Encounters:   07/08/19 59.4 kg (131 lb)    Estimated body mass index is 21.31 kg/m  as calculated from the following:    Height as of 7/8/19: 1.67 m (5' 5.75\").    Weight as of 7/8/19: 59.4 kg (131 lb).       Anesthesia Plan      History & Physical Review      ASA Status:  1 .    NPO Status:  > 8 hours    Plan for MAC          Postoperative Care      Consents  Anesthetic plan, risks, benefits and alternatives discussed with:  Patient.  Use of blood products discussed: Yes.   Use of blood products discussed with Patient.  Consented to blood products.  .                 MARILYN Pena CRNA  "

## 2019-07-29 NOTE — ANESTHESIA POSTPROCEDURE EVALUATION
Patient: Zakiya Hopkins    Procedure(s):  COLONOSCOPY, WITH POLYPECTOMY AND BIOPSY    Diagnosis:diarrhea  Diagnosis Additional Information: No value filed.    Anesthesia Type:  MAC    Note:  Anesthesia Post Evaluation    Patient location during evaluation: Phase 2  Patient participation: Able to fully participate in evaluation  Level of consciousness: awake and alert  Pain management: adequate  Airway patency: patent  Cardiovascular status: acceptable  Respiratory status: acceptable  Hydration status: acceptable  PONV: none     Anesthetic complications: None          Last vitals:  Vitals:    07/29/19 0837   BP: 128/77   Pulse: 76   Resp: 16   Temp: 97.9  F (36.6  C)   SpO2: 98%         Electronically Signed By: MARILYN NAVAS CRNA  July 29, 2019  9:46 AM

## 2019-07-29 NOTE — H&P
History and Physical    CHIEF COMPLAINT / REASON FOR PROCEDURE:  Diarrhea    PERTINENT HISTORY   Patient is a 64 year old female who presents today for colonoscopy for diarrhea.   Last colonoscopy 2005. Studies negative.  Patient has no other complaints.    Past Medical History:   Diagnosis Date     Bell's palsy     8/12,Postoperative complication after acoustic neuroma resection     Other shoulder lesions, left shoulder     No Comments Provided     Other specified congenital malformations of intestine     04/05     Personal history of other medical treatment (CODE)     age 11     Past Surgical History:   Procedure Laterality Date     COLONOSCOPY      04/05     CRANIOTOMY MIDDLE FOSSA, EXCISE ACOUSTIC NEUROMA, COMBINED Right 08/2012    [facility]     EXCISE MASS FINGER Right 5/31/2019    Procedure: Excision mucous cyst Right Thumb IP Joint;  Surgeon: John Chaparro MD;  Location: [facility] OR     HYSTERECTOMY TOTAL ABDOMINAL, BILATERAL SALPINGO-OOPHORECTOMY, COMBINED      No Comments Provided       Bleeding tendencies:  No    ALLERGIES/SENSITIVITIES:   Allergies   Allergen Reactions     Hydromorphone Nausea and Nausea and Vomiting     ha     Oxycodone Nausea     ha     Erythromycin Swelling     Other reaction(s): Edema  ointment  Ophthalmic route     Meperidine Nausea and Vomiting        CURRENT MEDICATIONS:    Prior to Admission medications    Medication Sig Start Date End Date Taking? Authorizing Provider   Cholecalciferol (VITAMIN D3) 1000 UNITS CAPS Take 2 capsules by mouth daily 1/14/15  Yes Reported, Patient   zolpidem (AMBIEN) 5 MG tablet Take 2.5 mg by mouth nightly as needed for sleep   Yes Reported, Patient   Artificial Tear Ointment (REFRESH P.M. OP) Apply  to eye.    Reported, Patient   cyclobenzaprine (FLEXERIL) 5 MG tablet Take 1-2 tablets (5-10 mg) by mouth 3 times daily as needed for muscle spasms 2/12/19 7/8/19  Armani Guidry MD       Physical Exam:  /77 (Cuff Size: Adult Regular)   " Pulse 76   Temp 97.9  F (36.6  C) (Temporal)   Resp 16   Ht 1.67 m (5' 5.75\")   Wt 59.4 kg (131 lb)   SpO2 98%   BMI 21.31 kg/m    EXAM:  Chest/Respiratory Exam: Normal - Clear to auscultation without rales, rhonchi, or wheezing.  Cardiovascular Exam: normal, regular rate and rhythm      PLAN: COLONOSCOPY .  Patient understands risks of bleeding, perforation, potential inability to reach cecum, aspiration and wishes to proceed.    "

## 2019-07-31 PROBLEM — K63.5 COLON POLYPS: Status: RESOLVED | Noted: 2019-07-29 | Resolved: 2019-07-31

## 2019-07-31 PROBLEM — K52.831 COLLAGENOUS COLITIS: Status: ACTIVE | Noted: 2019-07-10

## 2019-08-01 ENCOUNTER — TELEPHONE (OUTPATIENT)
Dept: SURGERY | Facility: OTHER | Age: 64
End: 2019-08-01

## 2019-08-01 DIAGNOSIS — K52.831 COLLAGENOUS COLITIS: Primary | ICD-10-CM

## 2019-08-01 RX ORDER — BUDESONIDE 3 MG/1
9 CAPSULE, COATED PELLETS ORAL EVERY MORNING
Qty: 90 CAPSULE | Refills: 1 | Status: SHIPPED | OUTPATIENT
Start: 2019-08-01 | End: 2020-06-19

## 2020-03-30 DIAGNOSIS — G51.39 FACIAL SPASM: ICD-10-CM

## 2020-03-31 RX ORDER — CYCLOBENZAPRINE HCL 5 MG
TABLET ORAL
Qty: 30 TABLET | Refills: 0 | Status: SHIPPED | OUTPATIENT
Start: 2020-03-31 | End: 2021-02-24

## 2020-03-31 NOTE — TELEPHONE ENCOUNTER
[facility] sent Rx request for the following:      Cyclobenzaprine HCl 5 MG Oral Tablet   Sig: TAKE 1 TO 2 TABLETS BY MOUTH THREE TIMES DAILY AS NEEDED FOR MUSCLE SPASM     Last Prescription Date:   2/12/2019  Last Fill Qty/Refills:         30, R-11    Last Office Visit:              5/20/2019   Future Office visit:           none    Routing refill request to provider for review/approval because:  Drug not on the [facility] or [facility] refill protocol or controlled substance  Drug not active on patient's medication list    Unable to complete prescription refill per RN Medication Refill Policy.................... Gilmar Vega RN ....................  3/31/2020   9:36 AM

## 2020-05-11 ENCOUNTER — HOSPITAL ENCOUNTER (OUTPATIENT)
Dept: MAMMOGRAPHY | Facility: OTHER | Age: 65
Discharge: HOME | End: 2020-05-11
Attending: FAMILY MEDICINE | Admitting: FAMILY MEDICINE
Payer: MEDICARE

## 2020-05-11 DIAGNOSIS — Z12.31 VISIT FOR SCREENING MAMMOGRAM: ICD-10-CM

## 2020-05-11 PROCEDURE — 77067 SCR MAMMO BI INCL CAD: CPT

## 2020-06-19 ENCOUNTER — OFFICE VISIT (OUTPATIENT)
Dept: INTERNAL MEDICINE | Facility: OTHER | Age: 65
End: 2020-06-19
Attending: NURSE PRACTITIONER
Payer: COMMERCIAL

## 2020-06-19 VITALS
WEIGHT: 132.25 LBS | TEMPERATURE: 96.6 F | HEIGHT: 66 IN | RESPIRATION RATE: 20 BRPM | DIASTOLIC BLOOD PRESSURE: 80 MMHG | BODY MASS INDEX: 21.25 KG/M2 | HEART RATE: 60 BPM | SYSTOLIC BLOOD PRESSURE: 136 MMHG

## 2020-06-19 DIAGNOSIS — Z13.9 SCREENING FOR CONDITION: Primary | ICD-10-CM
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
## 2020-06-19 PROCEDURE — G0463 HOSPITAL OUTPT CLINIC VISIT: HCPCS | Performed by: NURSE PRACTITIONER

## 2020-06-19 PROCEDURE — 99213 OFFICE O/P EST LOW 20 MIN: CPT | Performed by: NURSE PRACTITIONER

## 2020-06-19 RX ORDER — DOXYCYCLINE 100 MG/1
200 TABLET ORAL ONCE
Qty: 2 TABLET | Refills: 0 | Status: SHIPPED | OUTPATIENT
Start: 2020-06-19 | End: 2020-06-19

## 2020-06-19 ASSESSMENT — ANXIETY QUESTIONNAIRES
2. NOT BEING ABLE TO STOP OR CONTROL WORRYING: NOT AT ALL
6. BECOMING EASILY ANNOYED OR IRRITABLE: NOT AT ALL
7. FEELING AFRAID AS IF SOMETHING AWFUL MIGHT HAPPEN: NOT AT ALL
GAD7 TOTAL SCORE: 0
IF YOU CHECKED OFF ANY PROBLEMS ON THIS QUESTIONNAIRE, HOW DIFFICULT HAVE THESE PROBLEMS MADE IT FOR YOU TO DO YOUR WORK, TAKE CARE OF THINGS AT HOME, OR GET ALONG WITH OTHER PEOPLE: NOT DIFFICULT AT ALL
3. WORRYING TOO MUCH ABOUT DIFFERENT THINGS: NOT AT ALL
5. BEING SO RESTLESS THAT IT IS HARD TO SIT STILL: NOT AT ALL
1. FEELING NERVOUS, ANXIOUS, OR ON EDGE: NOT AT ALL

## 2020-06-19 ASSESSMENT — ENCOUNTER SYMPTOMS
WOUND: 1
COLOR CHANGE: 1

## 2020-06-19 ASSESSMENT — PAIN SCALES - GENERAL: PAINLEVEL: MILD PAIN (2)

## 2020-06-19 ASSESSMENT — MIFFLIN-ST. JEOR: SCORE: 1155.6

## 2020-06-19 ASSESSMENT — PATIENT HEALTH QUESTIONNAIRE - PHQ9
SUM OF ALL RESPONSES TO PHQ QUESTIONS 1-9: 0
5. POOR APPETITE OR OVEREATING: NOT AT ALL

## 2020-06-19 NOTE — PROGRESS NOTES
"Nursing Notes:   Loly Chang LPN  6/19/2020  8:51 AM  Signed  Patient presents to the clinic for tick that was removed last night from the right rib/middle back area.    Chief Complaint   Patient presents with     Insect Bites       Initial /80 (BP Location: Right arm, Patient Position: Sitting, Cuff Size: Adult Regular)   Pulse 60   Temp 96.6  F (35.9  C) (Tympanic)   Resp 20   Ht 1.683 m (5' 6.25\")   Wt 60 kg (132 lb 4 oz)   BMI 21.18 kg/m   Estimated body mass index is 21.18 kg/m  as calculated from the following:    Height as of this encounter: 1.683 m (5' 6.25\").    Weight as of this encounter: 60 kg (132 lb 4 oz).  Medication Reconciliation: complete    Loly Chang LPN     Nursing note reviewed with patient.  Accuracy and completeness verified.    SUBJECTIVE:                                                      Zakiya Hopkins is a 65 year old year old female patient who presents to the clinic today for a tick bite on right side/back under her arm. It is red and painful. She brings tick with her today and it is a deer tick. She denies any additional symptoms.  Problem List/PMH: Reviewed in EMR, and made relevant updates today.  Medications: Reviewed in EMR, and made relevant updates today.  Allergies: Reviewed in EMR, and made relevant updates today.    Current Code Status:  Prior    REVIEW OF SYSTEMS:    Review of Systems   Skin: Positive for color change and wound.        Tick bite on right upper side/under arm.   All other systems reviewed and are negative.      OBJECTIVE:                                                      EXAM:     /80 (BP Location: Right arm, Patient Position: Sitting, Cuff Size: Adult Regular)   Pulse 60   Temp 96.6  F (35.9  C) (Tympanic)   Resp 20   Ht 1.683 m (5' 6.25\")   Wt 60 kg (132 lb 4 oz)   BMI 21.18 kg/m      Current Pain Score: Mild Pain (2)     Physical Exam  Vitals signs reviewed.   Constitutional:       Appearance: Normal appearance. "   HENT:      Head: Normocephalic and atraumatic.   Cardiovascular:      Rate and Rhythm: Normal rate and regular rhythm.      Heart sounds: Normal heart sounds.   Pulmonary:      Effort: Pulmonary effort is normal.      Breath sounds: Normal breath sounds.   Abdominal:      General: Bowel sounds are normal.   Musculoskeletal: Normal range of motion.   Skin:     General: Skin is warm.      Findings: Erythema and lesion (under right arm on flank) present.   Neurological:      Mental Status: She is alert and oriented to person, place, and time.   Psychiatric:         Mood and Affect: Mood normal.         Behavior: Behavior normal.         Thought Content: Thought content normal.         Judgment: Judgment normal.          Diagnostics Completed at this Visit:  No results found for any visits on 06/19/20.     ASSESSMENT/PLAN:                                                      1. Tick bite, initial encounter  - doxycycline monohydrate (ADOXA) 100 MG tablet; Take 2 tablets (200 mg) by mouth once for 1 dose  Dispense: 2 tablet; Refill: 0    2. Screening for condition  - REVIEW OF HEALTH MAINTENANCE PROTOCOL ORDERS    Patient verbalizes understanding and is agreeable to plan of care.    Return if symptoms worsen or fail to improve.       MARILYN Barry, AGNP-C  Internal Medicine  [facility]    06/19/2020 8:59 AM    Portions of this note were dictated using speech recognition software. The note has been proofread but errors in the text may have been overlooked. Please contact me if there are any concerns regarding the accuracy of the dictation.

## 2020-06-19 NOTE — NURSING NOTE
"Patient presents to the clinic for tick that was removed last night from the right rib/middle back area.    Chief Complaint   Patient presents with     Insect Bites       Initial /80 (BP Location: Right arm, Patient Position: Sitting, Cuff Size: Adult Regular)   Pulse 60   Temp 96.6  F (35.9  C) (Tympanic)   Resp 20   Ht 1.683 m (5' 6.25\")   Wt 60 kg (132 lb 4 oz)   BMI 21.18 kg/m   Estimated body mass index is 21.18 kg/m  as calculated from the following:    Height as of this encounter: 1.683 m (5' 6.25\").    Weight as of this encounter: 60 kg (132 lb 4 oz).  Medication Reconciliation: complete    Loly Chang LPN    "

## 2020-06-20 ASSESSMENT — ANXIETY QUESTIONNAIRES: GAD7 TOTAL SCORE: 0

## 2020-11-30 ENCOUNTER — HOSPITAL ENCOUNTER (OUTPATIENT)
Dept: GENERAL RADIOLOGY | Facility: OTHER | Age: 65
End: 2020-11-30
Attending: FAMILY MEDICINE
Payer: MEDICARE

## 2020-11-30 ENCOUNTER — OFFICE VISIT (OUTPATIENT)
Dept: FAMILY MEDICINE | Facility: OTHER | Age: 65
End: 2020-11-30
Attending: FAMILY MEDICINE
Payer: MEDICARE

## 2020-11-30 VITALS
HEART RATE: 75 BPM | RESPIRATION RATE: 16 BRPM | SYSTOLIC BLOOD PRESSURE: 138 MMHG | DIASTOLIC BLOOD PRESSURE: 78 MMHG | TEMPERATURE: 98 F | WEIGHT: 133.8 LBS | OXYGEN SATURATION: 99 % | BODY MASS INDEX: 21.43 KG/M2

## 2020-11-30 DIAGNOSIS — M79.644 THUMB PAIN, RIGHT: ICD-10-CM

## 2020-11-30 DIAGNOSIS — M79.644 THUMB PAIN, RIGHT: Primary | ICD-10-CM

## 2020-11-30 DIAGNOSIS — L82.0 INFLAMED SEBORRHEIC KERATOSIS: ICD-10-CM

## 2020-11-30 PROCEDURE — G0463 HOSPITAL OUTPT CLINIC VISIT: HCPCS | Mod: 25

## 2020-11-30 PROCEDURE — 99213 OFFICE O/P EST LOW 20 MIN: CPT | Mod: 25 | Performed by: FAMILY MEDICINE

## 2020-11-30 PROCEDURE — G0463 HOSPITAL OUTPT CLINIC VISIT: HCPCS

## 2020-11-30 PROCEDURE — 73140 X-RAY EXAM OF FINGER(S): CPT | Mod: RT

## 2020-11-30 PROCEDURE — 17110 DESTRUCTION B9 LES UP TO 14: CPT | Mod: XU | Performed by: FAMILY MEDICINE

## 2020-11-30 ASSESSMENT — PAIN SCALES - GENERAL: PAINLEVEL: MILD PAIN (3)

## 2020-11-30 NOTE — PROGRESS NOTES
There are no exam notes on file for this visit.    SUBJECTIVE:  Zakiya Hopkins  is a 65 year old female who comes in today because of right thumb pain.  She previously had a cyst excised from her right thumb in May 2019.  This was a mucous cyst arising from the IP joint of the right thumb.  They also debrided a dorsal osteophyte and removed a ganglion cyst from her right wrist.  Dr. Chaparro did the surgery.    She injured her thumb this fall. She thinks it was from the leash for her new dog.     She has some irritated seborrheic keratoses on her chest.  They will at times bleed.  She has a hard time not picking at them.    Past Medical, Family, and Social History reviewed and updated as noted below.   ROS is negative except as noted above       Allergies   Allergen Reactions     Hydromorphone Nausea and Nausea and Vomiting     ha     Oxycodone Nausea     ha     Erythromycin Swelling     Other reaction(s): Edema  ointment  Ophthalmic route     Meperidine Nausea and Vomiting   ,   Family History   Problem Relation Age of Onset     Other - See Comments Mother         Chemical dependency/Alzheimers     Hypertension Father         Hypertension     Diabetes Father         Diabetes     Other - See Comments Father         Chemical depedency/Stroke     Heart Disease Father         Heart Disease,angioplasty     Breast Cancer Maternal Grandmother         Cancer-breast     Heart Disease Maternal Grandmother         Heart Disease,CAD     Breast Cancer Sister         Cancer-breast     Other - See Comments Sister         Bipolar     Substance Abuse Brother         Alcohol/Drug,Alcoholic   ,   Current Outpatient Medications   Medication     Cholecalciferol (VITAMIN D3) 1000 UNITS CAPS     cyclobenzaprine (FLEXERIL) 5 MG tablet     No current facility-administered medications for this visit.    ,   Past Medical History:   Diagnosis Date     Bell's palsy     8/12,Postoperative complication after acoustic neuroma resection      Other shoulder lesions, left shoulder     No Comments Provided     Other specified congenital malformations of intestine     04/05     Personal history of other medical treatment (CODE)     age 11   ,   Patient Active Problem List    Diagnosis Date Noted     Tick bite 06/19/2020     Priority: Medium     Sigmoid diverticulosis 07/29/2019     Priority: Medium     Collagenous colitis 07/10/2019     Priority: Medium     Lesion of plantar nerve 01/26/2018     Priority: Medium     Insomnia 10/20/2015     Priority: Medium     Facial nerve palsy, secondary 09/10/2015     Priority: Medium     Vertigo of central origin 11/03/2012     Priority: Medium     C6 radiculopathy 11/01/2012     Priority: Medium     Acoustic neuroma (H) 07/17/2012     Priority: Medium     Arthropathy 10/04/2011     Priority: Medium   ,   Past Surgical History:   Procedure Laterality Date     COLONOSCOPY      04/05     COLONOSCOPY  07/29/2019    F/U 2029     COLONOSCOPY N/A 7/29/2019    Hyperplastic polyps, 10 year follow up     CRANIOTOMY MIDDLE FOSSA, EXCISE ACOUSTIC NEUROMA, COMBINED Right 08/2012    [facility]     EXCISE MASS FINGER Right 5/31/2019    Procedure: Excision mucous cyst Right Thumb IP Joint;  Surgeon: John Chaparro MD;  Location: [facility] OR     HYSTERECTOMY TOTAL ABDOMINAL, BILATERAL SALPINGO-OOPHORECTOMY, COMBINED      No Comments Provided    and   Social History     Tobacco Use     Smoking status: Never Smoker     Smokeless tobacco: Never Used   Substance Use Topics     Alcohol use: Yes     Comment: Alcoholic Drinks/day: rare     OBJECTIVE:  /78   Pulse 75   Temp 98  F (36.7  C) (Temporal)   Resp 16   Wt 60.7 kg (133 lb 12.8 oz)   SpO2 99%   Breastfeeding No   BMI 21.43 kg/m     EXAM:  Alert and cooperative, no distress.  Inflamed seborrheic keratoses x3 are frozen serially x2 with liquid nitrogen on her chest.    Examination of her right thumb reveals significant degenerative change.  She has tenderness to  palpation about the PIP joint.  The CMC joint does not seem to be as much affected is the one more distally.  She has some tendinitis of the flexor mechanism especially in the insertion of the base of the thumb.    Results for orders placed or performed during the hospital encounter of 11/30/20   XR Finger Right G/E 2 Views     Status: None    Narrative    PROCEDURE:  XR FINGER RT G/E 2 VW    HISTORY: Thumb pain, right.    COMPARISON:  4/5/2019    TECHNIQUE:  3 views.    FINDINGS:  There is moderate degenerative changes of the first  metacarpal carpal joint. Mild degenerative changes of the DIP joint.  No fracture seen.       Impression    IMPRESSION:   1. Moderate degenerative changes of the first metacarpal carpal carpal  joint.  2. Mild degenerative change of the DIP joint.  3. Negative for fracture.    DEAN GAGE MD      ASSESSMENT/Plan :    Zakiya was seen today for thumb discomfort.    Diagnoses and all orders for this visit:    Thumb pain, right  -     XR Finger Right G/E 2 Views; Future  -     Orthopedic & Spine  Referral; Future    Inflamed seborrheic keratosis  -     DESTRUCT BENIGN LESION, UP TO 14      For her thumb, feel that she would benefit from seeing Dr. Chaparro for possibly getting an injection.  Offered referral to occupational therapy but she wishes to try the shot first.  Continue with symptomatic treatment for now.  Follow-up if worsening or not improving.    Advised regarding the pain and vesiculation reaction that could be expected from cyrotherapy.     Offered Pneumovax and she declines.  Discussed Shingrix.        Armani Guidry MD

## 2020-12-14 ENCOUNTER — HEALTH MAINTENANCE LETTER (OUTPATIENT)
Age: 65
End: 2020-12-14

## 2021-02-23 DIAGNOSIS — G51.39 FACIAL SPASM: ICD-10-CM

## 2021-02-24 RX ORDER — CYCLOBENZAPRINE HCL 5 MG
TABLET ORAL
Qty: 30 TABLET | Refills: 0 | Status: SHIPPED | OUTPATIENT
Start: 2021-02-24 | End: 2021-05-05

## 2021-02-24 NOTE — TELEPHONE ENCOUNTER
Disp Refills Start End ALEAH   cyclobenzaprine (FLEXERIL) 5 MG tablet 30 tablet 0 3/31/2020  No   Sig: TAKE 1 TO 2 TABLETS BY MOUTH THREE TIMES DAILY AS NEEDED FOR MUSCLE SPASM       LOV: 11/30/2020  Future Office visit: No future appointment scheduled at this time.       Routing refill request to provider for review/approval because:  Drug not on the [facility] [place] [facility], [facility] or [facility] refill protocol or controlled substance    Requested Prescriptions   Pending Prescriptions Disp Refills     cyclobenzaprine (FLEXERIL) 5 MG tablet [Pharmacy Med Name: Cyclobenzaprine HCl 5 MG Oral Tablet] 30 tablet 0     Sig: TAKE 1 TO 2 TABLETS BY MOUTH THREE TIMES DAILY AS NEEDED FOR MUSCLE SPASM       There is no refill protocol information for this order        Unable to complete prescription refill per RN Medication Refill Policy.................... Echo Ramirez RN ....................  2/24/2021   2:20 PM

## 2021-05-05 DIAGNOSIS — G51.39 FACIAL SPASM: ICD-10-CM

## 2021-05-05 RX ORDER — CYCLOBENZAPRINE HCL 5 MG
TABLET ORAL
Qty: 30 TABLET | Refills: 0 | Status: SHIPPED | OUTPATIENT
Start: 2021-05-05 | End: 2021-07-19

## 2021-05-05 NOTE — TELEPHONE ENCOUNTER
Disp Refills Start End ALEAH   cyclobenzaprine (FLEXERIL) 5 MG tablet 30 tablet 0 2/24/2021  No   Sig: TAKE 1 TO 2 TABLETS BY MOUTH THREE TIMES DAILY AS NEEDED FOR MUSCLE SPASM       LOV: 11/30/2020  Future Office visit:       Routing refill request to provider for review/approval because:  Drug not on the [facility] [place] [facility], [email or web address] or [facility] refill protocol or controlled substance    Requested Prescriptions   Pending Prescriptions Disp Refills     cyclobenzaprine (FLEXERIL) 5 MG tablet [Pharmacy Med Name: Cyclobenzaprine HCl 5 MG Oral Tablet] 30 tablet 0     Sig: TAKE 1 TO 2 TABLETS BY MOUTH THREE TIMES DAILY AS NEEDED FOR MUSCLE SPASM       There is no refill protocol information for this order        Unable to complete prescription refill per RN Medication Refill Policy.................... Echo Ramirez RN ....................  5/5/2021   9:18 AM

## 2021-05-13 ENCOUNTER — HOSPITAL ENCOUNTER (OUTPATIENT)
Dept: MAMMOGRAPHY | Facility: OTHER | Age: 66
Discharge: HOME | End: 2021-05-13
Attending: FAMILY MEDICINE | Admitting: FAMILY MEDICINE
Payer: MEDICARE

## 2021-05-13 DIAGNOSIS — Z12.31 VISIT FOR SCREENING MAMMOGRAM: ICD-10-CM

## 2021-05-13 PROCEDURE — 77063 BREAST TOMOSYNTHESIS BI: CPT

## 2021-07-14 DIAGNOSIS — G51.39 FACIAL SPASM: ICD-10-CM

## 2021-07-16 NOTE — TELEPHONE ENCOUNTER
[facility] sent Rx request for the following:       Requested Prescriptions   Pending Prescriptions Disp Refills     cyclobenzaprine (FLEXERIL) 5 MG tablet [Pharmacy Med Name: Cyclobenzaprine HCl 5 MG Oral Tablet] 30 tablet 0     Sig: TAKE 1 TO 2 TABLETS BY MOUTH THREE TIMES DAILY AS NEEDED FOR MUSCLE SPASM        Last Prescription Date:   5/5/2021  Last Fill Qty/Refills:         30, R-0    Last Office Visit:              11/30/2020  Future Office visit:           none    Routing refill request to provider for review/approval because:  Drug not on the [facility], [facility] or [facility] refill protocol or controlled substance  Drug not on the [facility] refill protocol        There is no refill protocol information for this order        Unable to complete prescription refill per RN Medication Refill Policy.................... Breonna De La Rosa RN ....................  7/16/2021   3:37 PM

## 2021-07-19 RX ORDER — CYCLOBENZAPRINE HCL 5 MG
TABLET ORAL
Qty: 30 TABLET | Refills: 0 | Status: SHIPPED | OUTPATIENT
Start: 2021-07-19 | End: 2022-01-21

## 2021-10-02 ENCOUNTER — HEALTH MAINTENANCE LETTER (OUTPATIENT)
Age: 66
End: 2021-10-02

## 2022-01-21 ENCOUNTER — MYC REFILL (OUTPATIENT)
Dept: FAMILY MEDICINE | Facility: OTHER | Age: 67
End: 2022-01-21
Payer: COMMERCIAL

## 2022-01-21 DIAGNOSIS — G51.39 FACIAL SPASM: ICD-10-CM

## 2022-01-22 ENCOUNTER — HEALTH MAINTENANCE LETTER (OUTPATIENT)
Age: 67
End: 2022-01-22

## 2022-01-25 RX ORDER — CYCLOBENZAPRINE HCL 5 MG
TABLET ORAL
Qty: 30 TABLET | Refills: 1 | Status: SHIPPED | OUTPATIENT
Start: 2022-01-25 | End: 2022-08-30

## 2022-08-23 ASSESSMENT — ENCOUNTER SYMPTOMS
BREAST MASS: 0
HEMATURIA: 0
HEMATOCHEZIA: 0
FEVER: 0
SORE THROAT: 0
ARTHRALGIAS: 0
COUGH: 0
FREQUENCY: 0
EYE PAIN: 0
MYALGIAS: 0
NERVOUS/ANXIOUS: 1
NAUSEA: 0
WEAKNESS: 0
DIARRHEA: 0
DYSURIA: 0
ABDOMINAL PAIN: 0
DIZZINESS: 1
CHILLS: 0
PALPITATIONS: 0
CONSTIPATION: 0
HEADACHES: 0
SHORTNESS OF BREATH: 0
JOINT SWELLING: 0
PARESTHESIAS: 0
HEARTBURN: 0

## 2022-08-23 ASSESSMENT — ACTIVITIES OF DAILY LIVING (ADL): CURRENT_FUNCTION: NO ASSISTANCE NEEDED

## 2022-08-30 ENCOUNTER — HOSPITAL ENCOUNTER (OUTPATIENT)
Dept: MAMMOGRAPHY | Facility: OTHER | Age: 67
Discharge: HOME | End: 2022-08-30
Attending: FAMILY MEDICINE
Payer: MEDICARE

## 2022-08-30 ENCOUNTER — TELEPHONE (OUTPATIENT)
Dept: FAMILY MEDICINE | Facility: OTHER | Age: 67
End: 2022-08-30

## 2022-08-30 ENCOUNTER — OFFICE VISIT (OUTPATIENT)
Dept: FAMILY MEDICINE | Facility: OTHER | Age: 67
End: 2022-08-30
Attending: FAMILY MEDICINE
Payer: COMMERCIAL

## 2022-08-30 VITALS
TEMPERATURE: 97.4 F | OXYGEN SATURATION: 98 % | DIASTOLIC BLOOD PRESSURE: 80 MMHG | HEIGHT: 66 IN | HEART RATE: 62 BPM | SYSTOLIC BLOOD PRESSURE: 122 MMHG | BODY MASS INDEX: 20.06 KG/M2 | WEIGHT: 124.8 LBS | RESPIRATION RATE: 16 BRPM

## 2022-08-30 DIAGNOSIS — G47.00 INSOMNIA, UNSPECIFIED TYPE: ICD-10-CM

## 2022-08-30 DIAGNOSIS — Z23 NEED FOR PROPHYLACTIC VACCINATION AGAINST STREPTOCOCCUS PNEUMONIAE (PNEUMOCOCCUS): ICD-10-CM

## 2022-08-30 DIAGNOSIS — R06.83 SNORING: ICD-10-CM

## 2022-08-30 DIAGNOSIS — B37.0 ORAL THRUSH: ICD-10-CM

## 2022-08-30 DIAGNOSIS — K57.30 SIGMOID DIVERTICULOSIS: ICD-10-CM

## 2022-08-30 DIAGNOSIS — D33.3 ACOUSTIC NEUROMA (H): ICD-10-CM

## 2022-08-30 DIAGNOSIS — Z12.31 VISIT FOR SCREENING MAMMOGRAM: ICD-10-CM

## 2022-08-30 DIAGNOSIS — E78.5 DYSLIPIDEMIA: ICD-10-CM

## 2022-08-30 DIAGNOSIS — Z00.00 MEDICARE ANNUAL WELLNESS VISIT, SUBSEQUENT: Primary | ICD-10-CM

## 2022-08-30 DIAGNOSIS — G51.39 FACIAL SPASM: ICD-10-CM

## 2022-08-30 DIAGNOSIS — G51.0 FACIAL NERVE PALSY, SECONDARY: ICD-10-CM

## 2022-08-30 DIAGNOSIS — R40.0 DAYTIME SOMNOLENCE: ICD-10-CM

## 2022-08-30 DIAGNOSIS — L60.8 TOENAIL DEFORMITY: ICD-10-CM

## 2022-08-30 DIAGNOSIS — Z78.0 MENOPAUSE: ICD-10-CM

## 2022-08-30 DIAGNOSIS — K52.831 COLLAGENOUS COLITIS: ICD-10-CM

## 2022-08-30 PROCEDURE — G0463 HOSPITAL OUTPT CLINIC VISIT: HCPCS | Mod: 25

## 2022-08-30 PROCEDURE — G0439 PPPS, SUBSEQ VISIT: HCPCS | Performed by: FAMILY MEDICINE

## 2022-08-30 PROCEDURE — 90732 PPSV23 VACC 2 YRS+ SUBQ/IM: CPT

## 2022-08-30 PROCEDURE — 99215 OFFICE O/P EST HI 40 MIN: CPT | Mod: 25 | Performed by: FAMILY MEDICINE

## 2022-08-30 PROCEDURE — G0009 ADMIN PNEUMOCOCCAL VACCINE: HCPCS

## 2022-08-30 PROCEDURE — 77067 SCR MAMMO BI INCL CAD: CPT

## 2022-08-30 RX ORDER — CYCLOBENZAPRINE HCL 5 MG
TABLET ORAL
Qty: 30 TABLET | Refills: 1 | Status: SHIPPED | OUTPATIENT
Start: 2022-08-30 | End: 2023-05-04

## 2022-08-30 RX ORDER — NYSTATIN 100000/ML
500000 SUSPENSION, ORAL (FINAL DOSE FORM) ORAL 4 TIMES DAILY
Qty: 400 ML | Refills: 1 | Status: SHIPPED | OUTPATIENT
Start: 2022-08-30 | End: 2023-12-06

## 2022-08-30 RX ORDER — FLUCONAZOLE 150 MG/1
150 TABLET ORAL
Qty: 3 TABLET | Refills: 0 | Status: SHIPPED | OUTPATIENT
Start: 2022-08-30 | End: 2022-09-06

## 2022-08-30 RX ORDER — TRAZODONE HYDROCHLORIDE 50 MG/1
50 TABLET, FILM COATED ORAL AT BEDTIME
Qty: 30 TABLET | Refills: 11 | Status: SHIPPED | OUTPATIENT
Start: 2022-08-30 | End: 2023-12-06

## 2022-08-30 ASSESSMENT — PAIN SCALES - GENERAL: PAINLEVEL: NO PAIN (0)

## 2022-08-30 ASSESSMENT — ACTIVITIES OF DAILY LIVING (ADL): CURRENT_FUNCTION: NO ASSISTANCE NEEDED

## 2022-08-30 NOTE — TELEPHONE ENCOUNTER
Talked with pharmacist from [facility] and they need two clarifications.  1. On the nystatin, how long should she be taking it for (duration of therapy)    2.  There is a drug interaction that pops up between the fluconazole and trazodone. It can cause a  QT time prolongation and can cause seratonin syndrome     Deedee Starr LPN, LPN  8/30/2022  9:26 AM

## 2022-08-30 NOTE — TELEPHONE ENCOUNTER
The nystatin should be for a week.  She should wait on the trazodone until the diflucan is done.  Armani Guidry MD on 8/30/2022 at 9:30 AM

## 2022-08-30 NOTE — PROGRESS NOTES
Nursing Notes:   Drea Messina LPN  8/30/2022  8:12 AM  Signed  Pt presents to clinic today for wellness physical.      Medication Reconciliation: complete  Drea Messina LPN        SUBJECTIVE:  Zakiya Hopkins  is a 67 year old female who comes in today for Medicare wellness and follow-up of her other medical problems.      She has been snoring and not sleeping well. Not sure about apneas. She has tried taking benadryl and Unisom but they don't work well.  She falls asleep and having early awakening. She doesn't feel rested. At times she will wake up anxious.  She has not tolerated Ambien well in the past.    Her  has been sick all summer and his autoimmune disease was flared. He had diarrhea and had a big workup.  He has not been able to sleep well and has been to the bathroom, but he is getting better.     She has lost a couple of toenails. They turn yellow and will fall off. It came on about 6 weeks after her Covid vaccine.  Do not appear to be fungal.  No thickening or debris underneath.    I last saw her a couple years ago when she injured her thumb.  She previously had an excision of a mucous cyst from her right thumb the IP joint.    She has a history of acoustic neuroma for which she had surgery.  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    She is on no medications but does take vitamin D supplementation.  She is up-to-date on mammogram and has one scheduled for today.  She has not had DEXA scanning.  We discussed this and she is willing to go ahead.  She had colonoscopy in 2019 and is good till 2029.    She is vaccinated and boosted for COVID-19 but has not had her second booster.  She has had Zostavax but not Shingrix.  She gets an annual flu shot.  She is due for Boostrix this year.  She has not had Pneumovax.    Past Medical, Family, and Social History reviewed and updated as noted below.   ROS is negative except  as noted above       Allergies   Allergen Reactions     Hydromorphone Nausea and Nausea and Vomiting     ha     Oxycodone Nausea     ha     Erythromycin Swelling     Other reaction(s): Edema  ointment  Ophthalmic route     Meperidine Nausea and Vomiting   ,   Family History   Problem Relation Age of Onset     Other - See Comments Mother         Chemical dependency/Alzheimers     Hypertension Father         Hypertension     Diabetes Father         Diabetes     Other - See Comments Father         Chemical depedency/Stroke     Heart Disease Father         Heart Disease,angioplasty     Breast Cancer Maternal Grandmother         Cancer-breast     Heart Disease Maternal Grandmother         Heart Disease,CAD     Breast Cancer Sister         Cancer-breast     Other - See Comments Sister         Bipolar     Substance Abuse Brother         Alcohol/Drug,Alcoholic   ,   Current Outpatient Medications   Medication     Cholecalciferol (VITAMIN D3) 1000 UNITS CAPS     cyclobenzaprine (FLEXERIL) 5 MG tablet     fluconazole (DIFLUCAN) 150 MG tablet     nystatin (MYCOSTATIN) 070099 UNIT/ML suspension     traZODone (DESYREL) 50 MG tablet     No current facility-administered medications for this visit.   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     Personal history of other medical treatment (CODE)     age 11   ,   Patient Active Problem List    Diagnosis Date Noted     Tick bite 06/19/2020     Priority: Medium     Sigmoid diverticulosis 07/29/2019     Priority: Medium     Collagenous colitis 07/10/2019     Priority: Medium     Lesion of plantar nerve 01/26/2018     Priority: Medium     Insomnia 10/20/2015     Priority: Medium     Facial nerve palsy, secondary 09/10/2015     Priority: Medium     Vertigo of central  "origin 11/03/2012     Priority: Medium     C6 radiculopathy 11/01/2012     Priority: Medium     Acoustic neuroma (H) 07/17/2012     Priority: Medium     Arthropathy 10/04/2011     Priority: Medium   ,   Past Surgical History:   Procedure Laterality Date     COLONOSCOPY      04/05     COLONOSCOPY  07/29/2019    F/U 2029     COLONOSCOPY N/A 7/29/2019    Hyperplastic polyps, 10 year follow up     CRANIOTOMY MIDDLE FOSSA, EXCISE ACOUSTIC NEUROMA, COMBINED Right 08/2012    [facility]     EXCISE MASS FINGER Right 5/31/2019    Procedure: Excision mucous cyst Right Thumb IP Joint;  Surgeon: Jhon Chaparro MD;  Location: [facility] OR     HYSTERECTOMY TOTAL ABDOMINAL, BILATERAL SALPINGO-OOPHORECTOMY, COMBINED      No Comments Provided    and   Social History     Tobacco Use     Smoking status: Never Smoker     Smokeless tobacco: Never Used   Substance Use Topics     Alcohol use: Yes     Comment: Alcoholic Drinks/day: rare     OBJECTIVE:  /80   Pulse 62   Temp 97.4  F (36.3  C) (Tympanic)   Resp 16   Ht 1.67 m (5' 5.75\")   Wt 56.6 kg (124 lb 12.8 oz)   SpO2 98%   BMI 20.30 kg/m     EXAM:  General Appearance: Pleasant, alert, appropriate appearance for age. No acute distress  Head Exam: Normal. Normocephalic, atraumatic.  Eye Exam: PERRLA, EOMI, conjunctivae, sclerae normal.  Ear Exam: Normal TM's bilaterally. Normal auditory canals and external ears. Non-tender.  Nose Exam: Normal external nose, mucus membranes, and septum.  OroPharynx Exam:  Dental hygiene adequate. Normal buccal mucosa. Normal pharynx, but her tongue is covered with white plaque consistent with oral thrush.  Her throat is slightly reddened but she does not have a sore throat.  Some burning of her tongue is noted.  Neck Exam:  Supple, no masses or nodes. No bruits  Thyroid Exam: No nodules or enlargement.  Chest/Respiratory Exam: Normal chest wall and respirations. Clear to auscultation.  Breast Exam: No dimpling, nipple retraction or " discharge. No masses or nodes.  Cardiovascular Exam: Regular rate and rhythm. S1, S2, no murmur, click, gallop, or rubs.  Gastrointestinal Exam: Soft, non-tender, no masses or organomegaly.  Lymphatic Exam: Non-palpable nodes in neck,clavicular, axillary, or inguinal regions.  Musculoskeletal Exam: Back is straight and non-tender, full ROM of upper and lower extremities.  Foot Exam: Left and right foot: good pedal pulses, no lesions, nail hygiene good, but the tips of her great toenails appear to be somewhat  from the nailbed and are not thickened but are discolored and yellowed.  A couple of the nails are involved less so than the great nails..   Skin: no rash or abnormalities  Neurologic Exam:  normal gross motor, tone coordination and no tremor.  Psychiatric Exam: Alert and oriented - appropriate affect.     Results for orders placed or performed during the hospital encounter of 08/30/22   MA Screen Bilateral w/Favian     Status: None    Narrative    BILATERAL FULL FIELD DIGITAL SCREENING MAMMOGRAM WITH TOMOSYNTHESIS    Performed on: 8/30/22    Compared to: 05/13/2021, 05/11/2020, 03/12/2019, 03/06/2018, 03/02/2017,   03/15/2016, 01/14/2015, and 11/12/2013    Technique:  This study was evaluated with the assistance of Computer-Aided   Detection.  Breast Tomosynthesis was used in interpretation.    Findings: The breasts have scattered areas of fibroglandular density.    There is no radiographic evidence of malignancy.     IMPRESSION: ACR BI-RADS Category 1: Negative    RECOMMENDED FOLLOW-UP: Annual routine screening mammogram    The results and recommendations of this examination will be communicated   to the patient.          She went to her mammogram and was supposed to come back for labs but apparently did not do it.  We will let her know.    ASSESSMENT/Plan :    Zakiya was seen today for wellness visit.    Diagnoses and all orders for this visit:    Medicare annual wellness visit, subsequent    Need  for prophylactic vaccination against Streptococcus pneumoniae (pneumococcus)  -      IMM-  PNEUMOCOCCAL VACCINE,ADULT,SQ OR IM    Facial spasm  -     cyclobenzaprine (FLEXERIL) 5 MG tablet; TAKE 1 TO 2 TABLETS BY MOUTH THREE TIMES DAILY AS NEEDED FOR MUSCLE SPASM    Facial nerve palsy, secondary    Acoustic neuroma (H)  -     CBC with Platelets & Differential; Future  -     Comprehensive metabolic panel; Future  -     Lipid Profile; Future    Sigmoid diverticulosis  -     CBC with Platelets & Differential; Future  -     Comprehensive metabolic panel; Future    Collagenous colitis  -     CBC with Platelets & Differential; Future  -     Comprehensive metabolic panel; Future  -     Lipid Profile; Future    Dyslipidemia  -     Lipid Profile; Future    Snoring  -     Adult Sleep Eval & Management  Referral; Future    Daytime somnolence  -     Adult Sleep Eval & Management  Referral; Future    Insomnia, unspecified type  -     traZODone (DESYREL) 50 MG tablet; Take 1 tablet (50 mg) by mouth At Bedtime    Oral thrush  -     fluconazole (DIFLUCAN) 150 MG tablet; Take 1 tablet (150 mg) by mouth every 3 days for 3 doses  -     nystatin (MYCOSTATIN) 292037 UNIT/ML suspension; Take 5 mLs (500,000 Units) by mouth 4 times daily    Toenail deformity  -     Adult Dermatology Referral; Future    Menopause  -     DX Hip/Pelvis/Spine; Future        Will notify of lab results when available. Discussed diet, exercise and healthy lifestyle changes.  Placed on Diflucan 150 mg daily x3 days and the nystatin swish and spit 3 times daily for her oral thrush.    Will try trazodone at bedtime to help with sleep and discussed adjustment of dose.  She will wait to start that until after her Diflucan is done.    DEXA scan is ordered.    For her toenail deformity, referral sent for dermatology.    A total of 43 minutes was spent with the patient, reviewing records, tests, ordering medications, tests or procedures and  "documenting clinical information in the EHR in addition to Medicare Wellness.     Armani Guidry MD            Answers for HPI/ROS submitted by the patient on 8/23/2022  In general, how would you rate your overall physical health?: good  Frequency of exercise:: 4-5 days/week  Do you usually eat at least 4 servings of fruit and vegetables a day, include whole grains & fiber, and avoid regularly eating high fat or \"junk\" foods? : Yes  Taking medications regularly:: Yes  Medication side effects:: None  Activities of Daily Living: no assistance needed  Home safety: no safety concerns identified  Hearing Impairment:: no hearing concerns  In the past 6 months, have you been bothered by leaking of urine?: No  abdominal pain: No  Blood in stool: No  Blood in urine: No  chest pain: No  chills: No  congestion: No  constipation: No  cough: No  diarrhea: No  dizziness: Yes  ear pain: No  eye pain: No  nervous/anxious: Yes  fever: No  frequency: No  genital sores: No  headaches: No  hearing loss: No  heartburn: No  arthralgias: No  joint swelling: No  peripheral edema: No  mood changes: Yes  myalgias: No  nausea: No  dysuria: No  palpitations: No  Skin sensation changes: No  sore throat: No  urgency: No  rash: No  shortness of breath: No  visual disturbance: No  weakness: No  pelvic pain: No  vaginal bleeding: No  vaginal discharge: No  tenderness: No  breast mass: No  breast discharge: No  In general, how would you rate your overall mental or emotional health?: good  Additional concerns today:: Yes  Duration of exercise:: 15-30 minutes      "

## 2022-08-30 NOTE — PROGRESS NOTES
"SUBJECTIVE:   Zakiya Hopkins is a 67 year old female who presents for Preventive Visit.    Patient has been advised of split billing requirements and indicates understanding: Yes  Are you in the first 12 months of your Medicare coverage?  No    Healthy Habits:     In general, how would you rate your overall health?  Good    Frequency of exercise:  4-5 days/week    Duration of exercise:  15-30 minutes    Do you usually eat at least 4 servings of fruit and vegetables a day, include whole grains    & fiber and avoid regularly eating high fat or \"junk\" foods?  Yes    Taking medications regularly:  Yes    Medication side effects:  None    Ability to successfully perform activities of daily living:  No assistance needed    Home Safety:  No safety concerns identified    Hearing Impairment:  No hearing concerns    In the past 6 months, have you been bothered by leaking of urine?  No    In general, how would you rate your overall mental or emotional health?  Good      PHQ-2 Total Score: 1    Additional concerns today:  Yes    Do you feel safe in your environment? Yes    Have you ever done Advance Care Planning? (For example, a Health Directive, POLST, or a discussion with a medical provider or your loved ones about your wishes): No, advance care planning information given to patient to review.  Advanced care planning was discussed at today's visit.       Fall risk  Fallen 2 or more times in the past year?: No  Any fall with injury in the past year?: No    Cognitive Screening   1) Repeat 3 items (Leader, Season, Table)    2) Clock draw: NORMAL  3) 3 item recall: Recalls 3 objects  Results: 3 items recalled: COGNITIVE IMPAIRMENT LESS LIKELY    Mini-CogTM Copyright VIVIAN Nicole. Licensed by the author for use in [facility]; reprinted with permission (jenny@.[facility]). All rights reserved.      Do you have sleep apnea, excessive snoring or daytime drowsiness?: See HPI    Reviewed and updated as needed this visit by " "clinical staff   Tobacco  Allergies  Meds   Med Hx  Surg Hx  Fam Hx  Soc Hx          Reviewed and updated as needed this visit by Provider                   Social History     Tobacco Use     Smoking status: Never Smoker     Smokeless tobacco: Never Used   Substance Use Topics     Alcohol use: Yes     Comment: Alcoholic Drinks/day: rare         Alcohol Use 8/23/2022   Prescreen: >3 drinks/day or >7 drinks/week? No           Current providers sharing in care for this patient include:   Patient Care Team:  Armani Guidry MD as PCP - General  Armani Guidry MD as Assigned PCP    The following health maintenance items are reviewed in Epic and correct as of today:  Health Maintenance Due   Topic Date Due     DEXA  Never done     ADVANCE CARE PLANNING  Never done     HEPATITIS C SCREENING  Never done     ZOSTER IMMUNIZATION (2 of 3) 11/05/2015     MEDICARE ANNUAL WELLNESS VISIT  Never done     Pneumococcal Vaccine: 65+ Years (1 - PCV) Never done     COVID-19 Vaccine (4 - Booster for Moderna series) 03/19/2022     DTAP/TDAP/TD IMMUNIZATION (2 - Td or Tdap) 04/14/2022     INFLUENZA VACCINE (1) 09/01/2022     Labs reviewed in EPIC    FHS-7: No flowsheet data found.      Pertinent mammograms are reviewed under the imaging tab.    Review of Systems      OBJECTIVE:   /80   Pulse 62   Temp 97.4  F (36.3  C) (Tympanic)   Resp 16   Ht 1.67 m (5' 5.75\")   Wt 56.6 kg (124 lb 12.8 oz)   SpO2 98%   BMI 20.30 kg/m   Estimated body mass index is 20.3 kg/m  as calculated from the following:    Height as of this encounter: 1.67 m (5' 5.75\").    Weight as of this encounter: 56.6 kg (124 lb 12.8 oz).  Physical Exam          ASSESSMENT / PLAN:   Zakiya was seen today for wellness visit.    Diagnoses and all orders for this visit:    Medicare annual wellness visit, subsequent    Need for prophylactic vaccination against Streptococcus pneumoniae (pneumococcus)  -     [facility]MM-  PNEUMOCOCCAL VACCINE,ADULT,SQ OR " "IM    Facial spasm  -     cyclobenzaprine (FLEXERIL) 5 MG tablet; TAKE 1 TO 2 TABLETS BY MOUTH THREE TIMES DAILY AS NEEDED FOR MUSCLE SPASM    Facial nerve palsy, secondary    Acoustic neuroma (H)  -     CBC with Platelets & Differential; Future  -     Comprehensive metabolic panel; Future  -     Lipid Profile; Future    Sigmoid diverticulosis  -     CBC with Platelets & Differential; Future  -     Comprehensive metabolic panel; Future    Collagenous colitis  -     CBC with Platelets & Differential; Future  -     Comprehensive metabolic panel; Future  -     Lipid Profile; Future    Dyslipidemia  -     Lipid Profile; Future    Snoring  -     Adult Sleep Eval & Management  Referral; Future    Daytime somnolence  -     Adult Sleep Eval & Management  Referral; Future    Insomnia, unspecified type  -     traZODone (DESYREL) 50 MG tablet; Take 1 tablet (50 mg) by mouth At Bedtime    Oral thrush  -     fluconazole (DIFLUCAN) 150 MG tablet; Take 1 tablet (150 mg) by mouth every 3 days for 3 doses  -     nystatin (MYCOSTATIN) 868159 UNIT/ML suspension; Take 5 mLs (500,000 Units) by mouth 4 times daily    Toenail deformity  -     Adult Dermatology Referral; Future    Menopause  -     DX Hip/Pelvis/Spine; Future        Patient has been advised of split billing requirements and indicates understanding: Yes    COUNSELING:  Reviewed preventive health counseling, as reflected in patient instructions       Regular exercise       Healthy diet/nutrition       Colon cancer screening    Estimated body mass index is 20.3 kg/m  as calculated from the following:    Height as of this encounter: 1.67 m (5' 5.75\").    Weight as of this encounter: 56.6 kg (124 lb 12.8 oz).        She reports that she has never smoked. She has never used smokeless tobacco.      Appropriate preventive services were discussed with this patient, including applicable screening as appropriate for cardiovascular disease, diabetes, " osteopenia/osteoporosis, and glaucoma.  As appropriate for age/gender, discussed screening for colorectal cancer, prostate cancer, breast cancer, and cervical cancer. Checklist reviewing preventive services available has been given to the patient.    Reviewed patients plan of care and provided an AVS. The Basic Care Plan (routine screening as documented in Health Maintenance) for Zakiya meets the Care Plan requirement. This Care Plan has been established and reviewed with the Patient.    Counseling Resources:  ATP IV Guidelines  Pooled Cohorts Equation Calculator  Breast Cancer Risk Calculator  Breast Cancer: Medication to Reduce Risk  FRAX Risk Assessment  ICSI Preventive Guidelines  Dietary Guidelines for Americans, 2010  USDA's MyPlate  ASA Prophylaxis  Lung CA Screening    Armani Guidry MD  [facility]    Identified Health Risks:

## 2022-09-02 ENCOUNTER — LAB (OUTPATIENT)
Dept: LAB | Facility: OTHER | Age: 67
End: 2022-09-02
Attending: FAMILY MEDICINE
Payer: MEDICARE

## 2022-09-02 ENCOUNTER — ALLIED HEALTH/NURSE VISIT (OUTPATIENT)
Dept: FAMILY MEDICINE | Facility: OTHER | Age: 67
End: 2022-09-02
Attending: FAMILY MEDICINE
Payer: MEDICARE

## 2022-09-02 DIAGNOSIS — D33.3 ACOUSTIC NEUROMA (H): ICD-10-CM

## 2022-09-02 DIAGNOSIS — E78.5 DYSLIPIDEMIA: ICD-10-CM

## 2022-09-02 DIAGNOSIS — K52.831 COLLAGENOUS COLITIS: ICD-10-CM

## 2022-09-02 DIAGNOSIS — Z53.9 ERRONEOUS ENCOUNTER--DISREGARD: Primary | ICD-10-CM

## 2022-09-02 DIAGNOSIS — K57.30 SIGMOID DIVERTICULOSIS: ICD-10-CM

## 2022-09-02 LAB
ALBUMIN SERPL-MCNC: 4.5 G/DL (ref 3.5–5.7)
ALP SERPL-CCNC: 73 U/L (ref 34–104)
ALT SERPL W P-5'-P-CCNC: 14 U/L (ref 7–52)
ANION GAP SERPL CALCULATED.3IONS-SCNC: 7 MMOL/L (ref 3–14)
AST SERPL W P-5'-P-CCNC: 16 U/L (ref 13–39)
BASOPHILS # BLD AUTO: 0 10E3/UL (ref 0–0.2)
BASOPHILS NFR BLD AUTO: 1 %
BILIRUB SERPL-MCNC: 1.4 MG/DL (ref 0.3–1)
BUN SERPL-MCNC: 17 MG/DL (ref 7–25)
CALCIUM SERPL-MCNC: 9.8 MG/DL (ref 8.6–10.3)
CHLORIDE BLD-SCNC: 105 MMOL/L (ref 98–107)
CHOLEST SERPL-MCNC: 224 MG/DL
CO2 SERPL-SCNC: 30 MMOL/L (ref 21–31)
CREAT SERPL-MCNC: 0.69 MG/DL (ref 0.6–1.2)
EOSINOPHIL # BLD AUTO: 0.1 10E3/UL (ref 0–0.7)
EOSINOPHIL NFR BLD AUTO: 2 %
ERYTHROCYTE [DISTWIDTH] IN BLOOD BY AUTOMATED COUNT: 12.6 % (ref 10–15)
FASTING STATUS PATIENT QL REPORTED: YES
GFR SERPL CREATININE-BSD FRML MDRD: >90 ML/MIN/1.73M2
GLUCOSE BLD-MCNC: 87 MG/DL (ref 70–105)
HCT VFR BLD AUTO: 43.7 % (ref 35–47)
HDLC SERPL-MCNC: 68 MG/DL (ref 23–92)
HGB BLD-MCNC: 14.6 G/DL (ref 11.7–15.7)
IMM GRANULOCYTES # BLD: 0 10E3/UL
IMM GRANULOCYTES NFR BLD: 0 %
LDLC SERPL CALC-MCNC: 137 MG/DL
LYMPHOCYTES # BLD AUTO: 1.6 10E3/UL (ref 0.8–5.3)
LYMPHOCYTES NFR BLD AUTO: 35 %
MCH RBC QN AUTO: 29.6 PG (ref 26.5–33)
MCHC RBC AUTO-ENTMCNC: 33.4 G/DL (ref 31.5–36.5)
MCV RBC AUTO: 89 FL (ref 78–100)
MONOCYTES # BLD AUTO: 0.4 10E3/UL (ref 0–1.3)
MONOCYTES NFR BLD AUTO: 9 %
NEUTROPHILS # BLD AUTO: 2.5 10E3/UL (ref 1.6–8.3)
NEUTROPHILS NFR BLD AUTO: 53 %
NONHDLC SERPL-MCNC: 156 MG/DL
NRBC # BLD AUTO: 0 10E3/UL
NRBC BLD AUTO-RTO: 0 /100
PLATELET # BLD AUTO: 234 10E3/UL (ref 150–450)
POTASSIUM BLD-SCNC: 4.6 MMOL/L (ref 3.5–5.1)
PROT SERPL-MCNC: 6.9 G/DL (ref 6.4–8.9)
RBC # BLD AUTO: 4.94 10E6/UL (ref 3.8–5.2)
SODIUM SERPL-SCNC: 142 MMOL/L (ref 134–144)
TRIGL SERPL-MCNC: 93 MG/DL
WBC # BLD AUTO: 4.7 10E3/UL (ref 4–11)

## 2022-09-02 PROCEDURE — 85004 AUTOMATED DIFF WBC COUNT: CPT | Mod: ZL

## 2022-09-02 PROCEDURE — 36415 COLL VENOUS BLD VENIPUNCTURE: CPT | Mod: ZL

## 2022-09-02 PROCEDURE — 80061 LIPID PANEL: CPT | Mod: ZL

## 2022-09-02 PROCEDURE — 80053 COMPREHEN METABOLIC PANEL: CPT | Mod: ZL

## 2022-09-03 ENCOUNTER — HEALTH MAINTENANCE LETTER (OUTPATIENT)
Age: 67
End: 2022-09-03

## 2022-09-26 ENCOUNTER — HOSPITAL ENCOUNTER (OUTPATIENT)
Dept: BONE DENSITY | Facility: OTHER | Age: 67
Discharge: HOME | End: 2022-09-26
Attending: FAMILY MEDICINE | Admitting: FAMILY MEDICINE
Payer: MEDICARE

## 2022-09-26 DIAGNOSIS — Z78.0 MENOPAUSE: ICD-10-CM

## 2022-09-26 PROCEDURE — 77080 DXA BONE DENSITY AXIAL: CPT

## 2022-10-03 ENCOUNTER — DOCUMENTATION ONLY (OUTPATIENT)
Dept: SLEEP MEDICINE | Facility: HOSPITAL | Age: 67
End: 2022-10-03

## 2022-10-04 NOTE — PROGRESS NOTES
STOP BANG       Name: Zakiya Hopkins MRN# [id number]   Age: 67 year old YOB: 1955     Stop Bang questionnaire completed with a score of >3 to allow for HST     Have you been told you snore loudly (louder than talking or loud enough to be heard through doors)? YES    Do you often feel tired, fatigued, or sleepy during the daytime? YES    Has anyone observed you stop breathing during your sleep? NO    Do you have or are you being treated for high blood pressure? YES    Is your BMI greater than 35? NO    Is your neck size circumference 16 inches or greater? NO    Are you over 50 years old? YES    Stop Bang Score (# of yes): 4

## 2022-10-04 NOTE — PROGRESS NOTES
"Chart review prior to sleep testing.    Patient Summary:  67 year old yo female who is referred for chronic fatigue, insomnia, concern for sleep-disordered breathing.    Patient Active Problem List    Diagnosis Date Noted     Tick bite 06/19/2020     Priority: Medium     Sigmoid diverticulosis 07/29/2019     Priority: Medium     Collagenous colitis 07/10/2019     Priority: Medium     Lesion of plantar nerve 01/26/2018     Priority: Medium     Insomnia 10/20/2015     Priority: Medium     Facial nerve palsy, secondary 09/10/2015     Priority: Medium     Vertigo of central origin 11/03/2012     Priority: Medium     C6 radiculopathy 11/01/2012     Priority: Medium     Acoustic neuroma (H) 07/17/2012     Priority: Medium     Arthropathy 10/04/2011     Priority: Medium       Current Outpatient Medications   Medication     Cholecalciferol (VITAMIN D3) 1000 UNITS CAPS     cyclobenzaprine (FLEXERIL) 5 MG tablet     nystatin (MYCOSTATIN) 609493 UNIT/ML suspension     traZODone (DESYREL) 50 MG tablet     No current facility-administered medications for this visit.       Pertinent PMHx of chronic insomnia, acoustic neuroma, collagenous colitis.    STOP-BANG score of 4, with unknown neck circumference.  [place] score of 2.  BMI of Estimated body mass index is 20.3 kg/m  as calculated from the following:    Height as of 8/30/22: 1.67 m (5' 5.75\").    Weight as of 8/30/22: 56.6 kg (124 lb 12.8 oz).     Per questionnaire: \"Fatigue, snoring. I fall asleep easily, but often wake at 3-4 unable to return to sleep\"    Sxs for many years, no prior sleep testing.    Caffeine use:  No for 3+ per day.  No for within 6 hours of bed.    Tobacco use: No    Sleep pattern:  10pm to 5-6am, total sleep time ? hours.  Time to fall asleep: ~3-4 (?minutes).  Awakenings: \"varies\" times per night, 15 minutes to return to sleep, awake for total of 30-60 hours per night.  Napping.  0 days per week, - hours per nap.    Sleep Habits:   Do you read in bed? " Yes  Do you eat in bed? No  Do you watch TV in bed? No  Do you work in bed? No  Do you use a phone or computer in bed? No    No for RLS screen.  No for sleep walking.  No for dream enactment behavior.  No for bruxism.    Yes for morning headaches.  Yes for snoring.  No for observed apnea.  Yes for FHx of MECHELLE.    SHx:  , lives with .  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    A/P:    1.)  High likelihood of MECHELLE with STOP-BANG score of 4.   - Would appear to be candidate for either home sleep testing or in-lab PSG.    2.)  Chronic insomnia  - Appears to be predominantly sleep maintenance difficulty  - Unclear timing / duration of awakenings and unclear estimation of total sleep time  - Recommend to maintain sleep diaries for 2 weeks prior to clinic visit to get more detail sleep pattern with likely focus on behavioral modification.    ---  This note was written with the assistance of the Dragon voice-dictation technology software. The final document, although reviewed, may contain errors. For corrections, please contact the office.    Kurt Nicole MD    Sleep Medicine    [facility]  - [place], [place]  o Main Office: [phone]    [facility] [place], [place]  o [address], [address]  o Schedule visits: [phone]  o Main Office: [phone]  o Fax: [phone]
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
## 2022-10-19 ENCOUNTER — TRANSFERRED RECORDS (OUTPATIENT)
Dept: HEALTH INFORMATION MANAGEMENT | Facility: OTHER | Age: 67
End: 2022-10-19

## 2022-11-01 ENCOUNTER — TELEPHONE (OUTPATIENT)
Dept: PULMONOLOGY | Facility: OTHER | Age: 67
End: 2022-11-01

## 2022-11-01 DIAGNOSIS — R06.83 SNORING: ICD-10-CM

## 2022-11-01 DIAGNOSIS — G47.00 INSOMNIA: Primary | ICD-10-CM

## 2022-11-16 ENCOUNTER — OFFICE VISIT (OUTPATIENT)
Dept: SLEEP MEDICINE | Facility: HOSPITAL | Age: 67
End: 2022-11-16
Attending: FAMILY MEDICINE
Payer: COMMERCIAL

## 2022-11-16 DIAGNOSIS — R06.83 SNORING: ICD-10-CM

## 2022-11-16 DIAGNOSIS — G47.00 INSOMNIA: ICD-10-CM

## 2022-11-16 NOTE — PROGRESS NOTES
Patient was provided both verbal and written education and instructions on use of Watch PAT device. Watch PAT device has been registered and shipped via [employer] on 11/16/2022. Patient was notified that package was mailed out. Watch [employer] serial number: [id number].    Tracking #[id number]

## 2022-12-02 ENCOUNTER — DOCUMENTATION ONLY (OUTPATIENT)
Dept: SLEEP MEDICINE | Facility: HOSPITAL | Age: 67
End: 2022-12-02
Attending: FAMILY MEDICINE
Payer: MEDICARE

## 2022-12-02 DIAGNOSIS — G47.33 OSA (OBSTRUCTIVE SLEEP APNEA): Primary | ICD-10-CM

## 2022-12-02 DIAGNOSIS — G47.33 OSA (OBSTRUCTIVE SLEEP APNEA): ICD-10-CM

## 2022-12-02 PROCEDURE — 95800 SLP STDY UNATTENDED: CPT | Mod: 26

## 2022-12-02 PROCEDURE — 95800 SLP STDY UNATTENDED: CPT

## 2022-12-02 NOTE — PROCEDURES
"WatchPAT - HOME SLEEP STUDY INTERPRETATION    Patient: Zakiya Hopkins  MRN: [id number]  YOB: 1955  Study Date: 2022  Referring Provider: Armani Giudry  Ordering Provider: Kurt Nicole MD, MD    Chain of custody patient verification was not enabled.       Indications for Home Study: Zakiya Hopkins is a 67 year old female with a history of chronic insomnia, acoustic neuroma, collagenous colitis who presents with symptoms suggestive of obstructive sleep apnea.    Estimated body mass index is 20.3 kg/m  as calculated from the following:    Height as of 22: 1.67 m (5' 5.75\").    Weight as of 22: 56.6 kg (124 lb 12.8 oz).  [place] Sleepiness Scale:   STOP-BAN/8    Data: A full night home sleep study was performed recording the standard physiologic parameters including peripheral arterial tonometry (PAT), sound/snoring, body position,  movement, sound, and oxygen saturation by pulse oximetry. Pulse rate was estimated by oximetry recording. Sleep staging (wake, REM, light, and deep sleep) was derived from PAT signal.  This study was considered adequate based on > 4 hours of quality oximetry and respiratory recording. As specified by the AASM Manual for the Scoring of Sleep and Associated events, version 2.3, Rule VIII.D 1B, 4% oxygen desaturation scoring for hypopneas is used as a standard of care on all home sleep apnea testing.    Total Recording Time: 6 hrs, 32 min  Total Sleep Time: 5 hrs, 49 min  % of Sleep Time REM: 26.5%    Respiratory:  Snoring: Snoring was present.  Respiratory events: The PAT respiratory disturbance index [pRDI] was 4 events per hour.  The PAT apnea/hypopnea index [pAHI] was 0.5 events per hour.  ANDREA was 0.5 events per hour.  During REM sleep the pAHI was 0.  Sleep Associated Hypoxemia: sustained hypoxemia was not present. Mean oxygen saturation was 94%.  Minimum was 84%.  Time with saturation less than 88% was 0 minutes.    Heart Rate: By " pulse oximetry normal rate was noted.     Position: Percent of time spent: supine - 8.8%, prone - 0%, on right - 32.9%, on left - 58.3%.  pAHI was 3.9 per hour supine, - per hour prone, 0 per hour on right side, and 0.3 per hour on left side.     Assessment:   No significant obstructive sleep apnea.  Sleep associated hypoxemia was not present.    Recommendations:  - Consider in-lab PSG if high pre-test clinical concern for MECHELLE given potential for non-diagnostic home sleep testing.  - Suggest optimizing sleep hygiene and avoiding sleep deprivation.  - Weight management.    Diagnosis Code(s): Snoring R06.83    Kurt Nicole MD, MD, December 2, 2022   Diplomate, American Board of Family Medicine, Sleep Medicine

## 2022-12-02 NOTE — PROGRESS NOTES
WatchPAT data has been received and has been scored using rule 1B, 4%. Patient to follow up with provider to determine appropriate therapy.    Pat AHI: 0.5    Ordering Provider: Dr Kurt Nicole      Sleep Technician/Technologist: Lucille PEÑALOZA

## 2023-02-22 ENCOUNTER — DOCUMENTATION ONLY (OUTPATIENT)
Dept: OTHER | Facility: CLINIC | Age: 68
End: 2023-02-22
Payer: COMMERCIAL

## 2023-05-04 ENCOUNTER — MYC MEDICAL ADVICE (OUTPATIENT)
Dept: FAMILY MEDICINE | Facility: OTHER | Age: 68
End: 2023-05-04
Payer: COMMERCIAL

## 2023-05-04 DIAGNOSIS — G51.39 FACIAL SPASM: ICD-10-CM

## 2023-05-04 RX ORDER — CYCLOBENZAPRINE HCL 5 MG
TABLET ORAL
Qty: 30 TABLET | Refills: 1 | Status: SHIPPED | OUTPATIENT
Start: 2023-05-04 | End: 2023-12-06

## 2023-09-30 ENCOUNTER — HEALTH MAINTENANCE LETTER (OUTPATIENT)
Age: 68
End: 2023-09-30

## 2023-10-03 ENCOUNTER — HOSPITAL ENCOUNTER (OUTPATIENT)
Dept: MAMMOGRAPHY | Facility: OTHER | Age: 68
Discharge: HOME | End: 2023-10-03
Attending: FAMILY MEDICINE | Admitting: FAMILY MEDICINE
Payer: MEDICARE

## 2023-10-03 DIAGNOSIS — Z12.31 VISIT FOR SCREENING MAMMOGRAM: ICD-10-CM

## 2023-10-03 PROCEDURE — 77067 SCR MAMMO BI INCL CAD: CPT

## 2023-10-31 ENCOUNTER — TELEPHONE (OUTPATIENT)
Dept: FAMILY MEDICINE | Facility: CLINIC | Age: 68
End: 2023-10-31
Payer: COMMERCIAL

## 2023-10-31 NOTE — TELEPHONE ENCOUNTER
Forms received from: [employer]   Phone number listed: [phone]   Fax listed: [phone]  Date received: 10/26/23  Form description: ID # [id number]  Once forms are completed, please return to [employer] via fax.  Is patient requesting to be contacted when forms are completed: na  Phone: na  Form placed:  Dr. Ruth Zazueta

## 2023-11-03 ENCOUNTER — MEDICAL CORRESPONDENCE (OUTPATIENT)
Dept: HEALTH INFORMATION MANAGEMENT | Facility: CLINIC | Age: 68
End: 2023-11-03
Payer: COMMERCIAL

## 2023-11-24 ENCOUNTER — TELEPHONE (OUTPATIENT)
Dept: FAMILY MEDICINE | Facility: CLINIC | Age: 68
End: 2023-11-24
Payer: COMMERCIAL

## 2023-11-29 ASSESSMENT — ENCOUNTER SYMPTOMS
WEAKNESS: 0
DIARRHEA: 0
HEADACHES: 0
ABDOMINAL PAIN: 0
FREQUENCY: 1
PALPITATIONS: 0
ARTHRALGIAS: 0
SORE THROAT: 0
EYE PAIN: 0
HEMATOCHEZIA: 0
DYSURIA: 0
JOINT SWELLING: 0
COUGH: 0
SHORTNESS OF BREATH: 0
NERVOUS/ANXIOUS: 0
HEMATURIA: 0
DIZZINESS: 1
NAUSEA: 0
HEARTBURN: 0
BREAST MASS: 0
FEVER: 0
MYALGIAS: 0
CHILLS: 0

## 2023-11-29 ASSESSMENT — ACTIVITIES OF DAILY LIVING (ADL): CURRENT_FUNCTION: NO ASSISTANCE NEEDED

## 2023-12-06 ENCOUNTER — OFFICE VISIT (OUTPATIENT)
Dept: FAMILY MEDICINE | Facility: OTHER | Age: 68
End: 2023-12-06
Attending: FAMILY MEDICINE
Payer: COMMERCIAL

## 2023-12-06 VITALS
DIASTOLIC BLOOD PRESSURE: 88 MMHG | HEIGHT: 66 IN | RESPIRATION RATE: 16 BRPM | WEIGHT: 135 LBS | OXYGEN SATURATION: 98 % | SYSTOLIC BLOOD PRESSURE: 134 MMHG | TEMPERATURE: 97.5 F | BODY MASS INDEX: 21.69 KG/M2 | HEART RATE: 68 BPM

## 2023-12-06 DIAGNOSIS — G51.0 FACIAL NERVE PALSY, SECONDARY: ICD-10-CM

## 2023-12-06 DIAGNOSIS — G47.00 INSOMNIA, UNSPECIFIED TYPE: ICD-10-CM

## 2023-12-06 DIAGNOSIS — E78.5 DYSLIPIDEMIA: ICD-10-CM

## 2023-12-06 DIAGNOSIS — R73.09 ELEVATED GLUCOSE: ICD-10-CM

## 2023-12-06 DIAGNOSIS — Z00.00 MEDICARE ANNUAL WELLNESS VISIT, SUBSEQUENT: Primary | ICD-10-CM

## 2023-12-06 DIAGNOSIS — D33.3 ACOUSTIC NEUROMA (H): ICD-10-CM

## 2023-12-06 DIAGNOSIS — M81.0 OSTEOPOROSIS WITHOUT CURRENT PATHOLOGICAL FRACTURE, UNSPECIFIED OSTEOPOROSIS TYPE: ICD-10-CM

## 2023-12-06 DIAGNOSIS — G51.39 FACIAL SPASM: ICD-10-CM

## 2023-12-06 DIAGNOSIS — L57.0 ACTINIC KERATOSIS: ICD-10-CM

## 2023-12-06 LAB
ALBUMIN SERPL BCG-MCNC: 4.5 G/DL (ref 3.5–5.2)
ALP SERPL-CCNC: 86 U/L (ref 40–150)
ALT SERPL W P-5'-P-CCNC: 17 U/L (ref 0–50)
ANION GAP SERPL CALCULATED.3IONS-SCNC: 10 MMOL/L (ref 7–15)
AST SERPL W P-5'-P-CCNC: 23 U/L (ref 0–45)
BASOPHILS # BLD AUTO: 0 10E3/UL (ref 0–0.2)
BASOPHILS NFR BLD AUTO: 0 %
BILIRUB SERPL-MCNC: 1.2 MG/DL
BUN SERPL-MCNC: 13.1 MG/DL (ref 8–23)
CALCIUM SERPL-MCNC: 10 MG/DL (ref 8.8–10.2)
CHLORIDE SERPL-SCNC: 105 MMOL/L (ref 98–107)
CHOLEST SERPL-MCNC: 218 MG/DL
CREAT SERPL-MCNC: 0.65 MG/DL (ref 0.51–0.95)
DEPRECATED HCO3 PLAS-SCNC: 26 MMOL/L (ref 22–29)
EGFRCR SERPLBLD CKD-EPI 2021: >90 ML/MIN/1.73M2
EOSINOPHIL # BLD AUTO: 0.1 10E3/UL (ref 0–0.7)
EOSINOPHIL NFR BLD AUTO: 2 %
ERYTHROCYTE [DISTWIDTH] IN BLOOD BY AUTOMATED COUNT: 12.7 % (ref 10–15)
FASTING STATUS PATIENT QL REPORTED: ABNORMAL
GLUCOSE SERPL-MCNC: 97 MG/DL (ref 70–99)
HBA1C MFR BLD: 5.5 % (ref 4–6.2)
HCT VFR BLD AUTO: 44.5 % (ref 35–47)
HDLC SERPL-MCNC: 91 MG/DL
HGB BLD-MCNC: 14.9 G/DL (ref 11.7–15.7)
IMM GRANULOCYTES # BLD: 0 10E3/UL
IMM GRANULOCYTES NFR BLD: 0 %
LDLC SERPL CALC-MCNC: 113 MG/DL
LYMPHOCYTES # BLD AUTO: 1.9 10E3/UL (ref 0.8–5.3)
LYMPHOCYTES NFR BLD AUTO: 34 %
MCH RBC QN AUTO: 29.6 PG (ref 26.5–33)
MCHC RBC AUTO-ENTMCNC: 33.5 G/DL (ref 31.5–36.5)
MCV RBC AUTO: 89 FL (ref 78–100)
MONOCYTES # BLD AUTO: 0.5 10E3/UL (ref 0–1.3)
MONOCYTES NFR BLD AUTO: 9 %
NEUTROPHILS # BLD AUTO: 3.1 10E3/UL (ref 1.6–8.3)
NEUTROPHILS NFR BLD AUTO: 55 %
NONHDLC SERPL-MCNC: 127 MG/DL
NRBC # BLD AUTO: 0 10E3/UL
NRBC BLD AUTO-RTO: 0 /100
PLATELET # BLD AUTO: 252 10E3/UL (ref 150–450)
POTASSIUM SERPL-SCNC: 4.2 MMOL/L (ref 3.4–5.3)
PROT SERPL-MCNC: 6.9 G/DL (ref 6.4–8.3)
RBC # BLD AUTO: 5.03 10E6/UL (ref 3.8–5.2)
SODIUM SERPL-SCNC: 141 MMOL/L (ref 135–145)
TRIGL SERPL-MCNC: 69 MG/DL
WBC # BLD AUTO: 5.6 10E3/UL (ref 4–11)

## 2023-12-06 PROCEDURE — 17000 DESTRUCT PREMALG LESION: CPT | Performed by: FAMILY MEDICINE

## 2023-12-06 PROCEDURE — 99214 OFFICE O/P EST MOD 30 MIN: CPT | Mod: 25 | Performed by: FAMILY MEDICINE

## 2023-12-06 PROCEDURE — 17003 DESTRUCT PREMALG LES 2-14: CPT | Performed by: FAMILY MEDICINE

## 2023-12-06 PROCEDURE — 80053 COMPREHEN METABOLIC PANEL: CPT | Mod: ZL | Performed by: FAMILY MEDICINE

## 2023-12-06 PROCEDURE — 80061 LIPID PANEL: CPT | Mod: ZL | Performed by: FAMILY MEDICINE

## 2023-12-06 PROCEDURE — G0463 HOSPITAL OUTPT CLINIC VISIT: HCPCS | Mod: 25 | Performed by: FAMILY MEDICINE

## 2023-12-06 PROCEDURE — G0439 PPPS, SUBSEQ VISIT: HCPCS | Performed by: FAMILY MEDICINE

## 2023-12-06 PROCEDURE — 83036 HEMOGLOBIN GLYCOSYLATED A1C: CPT | Mod: ZL | Performed by: FAMILY MEDICINE

## 2023-12-06 PROCEDURE — 36415 COLL VENOUS BLD VENIPUNCTURE: CPT | Mod: ZL | Performed by: FAMILY MEDICINE

## 2023-12-06 PROCEDURE — 85025 COMPLETE CBC W/AUTO DIFF WBC: CPT | Mod: ZL | Performed by: FAMILY MEDICINE

## 2023-12-06 RX ORDER — ALENDRONATE SODIUM 70 MG/1
70 TABLET ORAL
Qty: 12 TABLET | Refills: 11 | Status: SHIPPED | OUTPATIENT
Start: 2023-12-06

## 2023-12-06 RX ORDER — CYCLOBENZAPRINE HCL 5 MG
TABLET ORAL
Qty: 30 TABLET | Refills: 11 | Status: SHIPPED | OUTPATIENT
Start: 2023-12-06

## 2023-12-06 RX ORDER — ZOLPIDEM TARTRATE 5 MG/1
5 TABLET ORAL
Qty: 30 TABLET | Refills: 5 | Status: SHIPPED | OUTPATIENT
Start: 2023-12-06 | End: 2024-07-25

## 2023-12-06 RX ORDER — CICLOPIROX OLAMINE 7.7 MG/G
CREAM TOPICAL
COMMUNITY
Start: 2023-09-27

## 2023-12-06 ASSESSMENT — ENCOUNTER SYMPTOMS
PALPITATIONS: 0
HEARTBURN: 0
NAUSEA: 0
FREQUENCY: 1
HEADACHES: 0
HEMATOCHEZIA: 0
ABDOMINAL PAIN: 0
EYE PAIN: 0
SHORTNESS OF BREATH: 0
WEAKNESS: 0
NERVOUS/ANXIOUS: 0
JOINT SWELLING: 0
HEMATURIA: 0
FEVER: 0
BREAST MASS: 0
MYALGIAS: 0
DIARRHEA: 0
DIZZINESS: 1
SORE THROAT: 0
CHILLS: 0
COUGH: 0
DYSURIA: 0
ARTHRALGIAS: 0

## 2023-12-06 ASSESSMENT — ACTIVITIES OF DAILY LIVING (ADL): CURRENT_FUNCTION: NO ASSISTANCE NEEDED

## 2023-12-06 ASSESSMENT — PAIN SCALES - GENERAL: PAINLEVEL: NO PAIN (0)

## 2023-12-06 NOTE — NURSING NOTE
"Chief Complaint   Patient presents with    Medicare Visit     annual       Initial /88   Pulse 68   Temp 97.5  F (36.4  C) (Temporal)   Resp 16   Ht 1.67 m (5' 5.75\")   Wt 61.2 kg (135 lb)   SpO2 98%   Breastfeeding No   BMI 21.96 kg/m   Estimated body mass index is 21.96 kg/m  as calculated from the following:    Height as of this encounter: 1.67 m (5' 5.75\").    Weight as of this encounter: 61.2 kg (135 lb).  Medication Review: complete    The next two questions are to help us understand your food security.  If you are feeling you need any assistance in this area, we have resources available to support you today.          12/6/2023   SDOH- Food Insecurity   Within the past 12 months, did you worry that your food would run out before you got money to buy more? N   Within the past 12 months, did the food you bought just not last and you didn t have money to get more? N         Health Care Directive:  Patient has a Health Care Directive on file      Mary Howard LPN      "

## 2023-12-06 NOTE — PROGRESS NOTES
"SUBJECTIVE:   Melly is a 68 year old, presenting for the following:  Medicare Visit (annual)        12/6/2023     1:29 PM   Additional Questions   Roomed by Mary Howard LPN       Are you in the first 12 months of your Medicare coverage?  No    Healthy Habits:     In general, how would you rate your overall health?  Good    Frequency of exercise:  4-5 days/week    Duration of exercise:  15-30 minutes    Do you usually eat at least 4 servings of fruit and vegetables a day, include whole grains    & fiber and avoid regularly eating high fat or \"junk\" foods?  Yes    Taking medications regularly:  Not Applicable    Ability to successfully perform activities of daily living:  No assistance needed    Home Safety:  No safety concerns identified    Hearing Impairment:  No hearing concerns    In the past 6 months, have you been bothered by leaking of urine?  No    In general, how would you rate your overall mental or emotional health?  Good    Additional concerns today:  No      Today's PHQ-2 Score:       12/6/2023    12:55 PM   PHQ-2 ( 1999 [employer])   Q1: Little interest or pleasure in doing things 1   Q2: Feeling down, depressed or hopeless 1   PHQ-2 Score 2   Q1: Little interest or pleasure in doing things Several days   Q2: Feeling down, depressed or hopeless Several days   PHQ-2 Score 2           Have you ever done Advance Care Planning? (For example, a Health Directive, POLST, or a discussion with a medical provider or your loved ones about your wishes): Yes, advance care planning is on file.       Fall risk  Fallen 2 or more times in the past year?: No  Any fall with injury in the past year?: No    Cognitive Screening   1) Repeat 3 items (Leader, Season, Table)    2) Clock draw: NORMAL  3) 3 item recall: Recalls 3 objects  Results: 3 items recalled: COGNITIVE IMPAIRMENT LESS LIKELY    Mini-CogTM Copyright S Corey. Licensed by the author for use in [facility]; reprinted with permission ([email or web address]). " All rights reserved.      Do you have sleep apnea, excessive snoring or daytime drowsiness? : no    Reviewed and updated as needed this visit by clinical staff   Tobacco  Allergies  Meds   Med Hx  Surg Hx  Fam Hx          Reviewed and updated as needed this visit by Provider                 Social History     Tobacco Use     Smoking status: Never     Passive exposure: Past     Smokeless tobacco: Never   Substance Use Topics     Alcohol use: Yes     Comment: Alcoholic Drinks/day: rare             11/29/2023     3:16 PM   Alcohol Use   Prescreen: >3 drinks/day or >7 drinks/week? No     Do you have a current opioid prescription? No  Do you use any other controlled substances or medications that are not prescribed by a provider? None              Current providers sharing in care for this patient include:   Patient Care Team:  Armani Guidry MD as PCP - General  Armani Guidry MD as Assigned PCP    The following health maintenance items are reviewed in Epic and correct as of today:  Health Maintenance   Topic Date Due     HEPATITIS C SCREENING  Never done     RSV VACCINE (Pregnancy & 60+) (1 - 1-dose 60+ series) Never done     ZOSTER IMMUNIZATION (2 of 3) 11/05/2015     MEDICARE ANNUAL WELLNESS VISIT  08/30/2023     INFLUENZA VACCINE (1) 09/01/2023     COVID-19 Vaccine (4 - 2023-24 season) 09/01/2023     Pneumococcal Vaccine: 65+ Years (2 - PCV) 08/30/2023     FALL RISK ASSESSMENT  12/06/2024     MAMMO SCREENING  10/03/2025     LIPID  09/02/2027     ADVANCE CARE PLANNING  02/22/2028     COLORECTAL CANCER SCREENING  07/29/2029     DTAP/TDAP/TD IMMUNIZATION (3 - Td or Tdap) 09/02/2032     DEXA  09/26/2037     PHQ-2 (once per calendar year)  Completed     IPV IMMUNIZATION  Aged Out     HPV IMMUNIZATION  Aged Out     MENINGITIS IMMUNIZATION  Aged Out     RSV MONOCLONAL ANTIBODY  Aged Out     Labs reviewed in EPIC          Review of Systems   Constitutional:  Negative for chills and fever.   HENT:  Negative for  "congestion, ear pain, hearing loss and sore throat.    Eyes:  Positive for visual disturbance. Negative for pain.   Respiratory:  Negative for cough and shortness of breath.    Cardiovascular:  Negative for chest pain, palpitations and peripheral edema.   Gastrointestinal:  Negative for abdominal pain, diarrhea, heartburn, hematochezia and nausea.   Breasts:  Negative for tenderness and breast mass.   Genitourinary:  Positive for frequency. Negative for dysuria, genital sores, hematuria, pelvic pain, urgency, vaginal bleeding and vaginal discharge.   Musculoskeletal:  Negative for arthralgias, joint swelling and myalgias.   Skin:  Negative for rash.   Neurological:  Positive for dizziness. Negative for weakness and headaches.   Psychiatric/Behavioral:  Positive for mood changes. The patient is not nervous/anxious.          OBJECTIVE:   /88   Pulse 68   Temp 97.5  F (36.4  C) (Temporal)   Resp 16   Ht 1.67 m (5' 5.75\")   Wt 61.2 kg (135 lb)   SpO2 98%   Breastfeeding No   BMI 21.96 kg/m   Estimated body mass index is 21.96 kg/m  as calculated from the following:    Height as of this encounter: 1.67 m (5' 5.75\").    Weight as of this encounter: 61.2 kg (135 lb).  Physical Exam          ASSESSMENT / PLAN:   Melly was seen today for medicare visit.    Diagnoses and all orders for this visit:    Medicare annual wellness visit, subsequent    Acoustic neuroma (H)  -     CBC with Platelets & Differential; Future  -     Comprehensive metabolic panel; Future  -     CBC with Platelets & Differential  -     Comprehensive metabolic panel  -     Cancel: MR Brain w/o & w Contrast; Future    Facial nerve palsy, secondary    Dyslipidemia  -     Comprehensive metabolic panel; Future  -     Lipid Profile; Future  -     Comprehensive metabolic panel  -     Lipid Profile    Insomnia, unspecified type  -     zolpidem (AMBIEN) 5 MG tablet; Take 1 tablet (5 mg) by mouth nightly as needed for sleep    Osteoporosis without " current pathological fracture, unspecified osteoporosis type  -     CBC with Platelets & Differential; Future  -     Comprehensive metabolic panel; Future  -     CBC with Platelets & Differential  -     Comprehensive metabolic panel  -     alendronate (FOSAMAX) 70 MG tablet; Take 1 tablet (70 mg) by mouth every 7 days    Elevated glucose  -     Hemoglobin A1c; Future  -     Hemoglobin A1c    Facial spasm  -     cyclobenzaprine (FLEXERIL) 5 MG tablet; TAKE 1 TO 2 TABLETS BY MOUTH THREE TIMES DAILY AS NEEDED FOR MUSCLE SPASM    Actinic keratosis  -     DESTRUCT PREMALIGNANT LESION, FIRST  -     DESTRUCT PREMALIGNANT LESION, 2-14        Patient has been advised of split billing requirements and indicates understanding: Yes      COUNSELING:  Reviewed preventive health counseling, as reflected in patient instructions       Regular exercise       Healthy diet/nutrition       Osteoporosis prevention/bone health       Colon cancer screening        She reports that she has never smoked. She has been exposed to tobacco smoke. She has never used smokeless tobacco.      Appropriate preventive services were discussed with this patient, including applicable screening as appropriate for fall prevention, nutrition, physical activity, Tobacco-use cessation, weight loss and cognition.  Checklist reviewing preventive services available has been given to the patient.    Reviewed patients plan of care and provided an AVS. The Basic Care Plan (routine screening as documented in Health Maintenance) for Zakiya meets the Care Plan requirement. This Care Plan has been established and reviewed with the Patient.          Armani Guidry MD  [facility]    Identified Health Risks:  I have reviewed Opioid Use Disorder and Substance Use Disorder risk factors and made any needed referrals.

## 2023-12-06 NOTE — PROGRESS NOTES
"Nursing Notes:   Mary Howard LPN  12/6/2023  1:36 PM  Sign at exiting of workspace  Chief Complaint   Patient presents with    Medicare Visit     annual       Initial /88   Pulse 68   Temp 97.5  F (36.4  C) (Temporal)   Resp 16   Ht 1.67 m (5' 5.75\")   Wt 61.2 kg (135 lb)   SpO2 98%   Breastfeeding No   BMI 21.96 kg/m   Estimated body mass index is 21.96 kg/m  as calculated from the following:    Height as of this encounter: 1.67 m (5' 5.75\").    Weight as of this encounter: 61.2 kg (135 lb).  Medication Review: complete    The next two questions are to help us understand your food security.  If you are feeling you need any assistance in this area, we have resources available to support you today.          12/6/2023   SDOH- Food Insecurity   Within the past 12 months, did you worry that your food would run out before you got money to buy more? N   Within the past 12 months, did the food you bought just not last and you didn t have money to get more? N         Health Care Directive:  Patient has a Health Care Directive on file      Mary Howard LPN      SUBJECTIVE:  Zakiya Hopkins  is a 68 year old female who comes in today for her Medicare wellness and follow-up of her other medical problems.  She was seen by Dr. Nicole for obstructive sleep apnea and insomnia.  MECHELLE was ruled out with a sleep study and she decided not to follow-up with him with regard to insomnia. She didn't tolerate trazodone. Remeron caused her to feel irritable.  Ambien works but she did not like the idea of taking it.  She does use some Flexeril for facial spasm which does make her little tired but she is never really tried that at night to sleep.    I last saw her in August 2022 for her Medicare wellness.  She has a history of acoustic neuroma for which she had surgery and has had some facial synkinesis and has had Botox injections in the past.  She still at times will have feeling of being a bit \"off\".  Mild " vertigo but not a lot of heavy spinning and nausea.  Review of her chart indicates that she had her last MRI of the IAC S and brain with and without contrast done in 2013 and was supposed to have a follow-up a year later but never did it.  Because of some of her symptoms, I do not think that would be a bad idea and then we will contact either Dr. Norris from ENT or Dr. Verdin from neurosurgery to see if there is anything further that needs to be done once we have the imaging.    She is up-to-date on colonoscopy.  She is currently not on any medications.  She had mammogram in August.  She had DEXA scan a year ago showing osteoporosis.  We discussed the possibility of doing a medication but major she was taking calcium and vitamin D and we need to talk about this at this visit.    She is vaccinated and boosted for COVID-19 but has not had the new booster nor has she had her flu shot this year.  She has not had RSV vaccine but is up-to-date on Boostrix.  She has had Zostavax but not Shingrix.      Past Medical, Family, and Social History reviewed and updated as noted below.   ROS is negative except as noted above       Allergies   Allergen Reactions    Hydromorphone Nausea and Nausea and Vomiting     ha    Oxycodone Nausea     ha    Erythromycin Swelling     Other reaction(s): Edema  ointment  Ophthalmic route    Meperidine Nausea and Vomiting   ,   Family History   Problem Relation Age of Onset    Other - See Comments Mother         Chemical dependency/Alzheimers    Hypertension Father         Hypertension    Diabetes Father         Diabetes    Other - See Comments Father         Chemical depedency/Stroke    Heart Disease Father         Heart Disease,angioplasty    Breast Cancer Maternal Grandmother         Cancer-breast    Heart Disease Maternal Grandmother         Heart Disease,CAD    Breast Cancer Sister         Cancer-breast    Other - See Comments Sister         Bipolar    Substance Abuse Brother          Alcohol/Drug,Alcoholic   ,   Current Outpatient Medications   Medication    alendronate (FOSAMAX) 70 MG tablet    Cholecalciferol (VITAMIN D3) 1000 UNITS CAPS    ciclopirox (LOPROX) 0.77 % cream    cyclobenzaprine (FLEXERIL) 5 MG tablet    zolpidem (AMBIEN) 5 MG tablet     No current facility-administered medications for this visit.   ,   Past Medical History:   Diagnosis Date    Bell's palsy     8/12,Postoperative complication after acoustic neuroma resection    Other shoulder lesions, left shoulder     No Comments Provided    Other specified congenital malformations of intestine     04/05    Personal history of other medical treatment (CODE)     age 11   ,   Patient Active Problem List    Diagnosis Date Noted    Osteoporosis without current pathological fracture, unspecified osteoporosis type 12/06/2023     Priority: Medium    Tick bite 06/19/2020     Priority: Medium    Sigmoid diverticulosis 07/29/2019     Priority: Medium    Collagenous colitis 07/10/2019     Priority: Medium    Lesion of plantar nerve 01/26/2018     Priority: Medium    Insomnia 10/20/2015     Priority: Medium    Facial nerve palsy, secondary 09/10/2015     Priority: Medium    Vertigo of central origin 11/03/2012     Priority: Medium    C6 radiculopathy 11/01/2012     Priority: Medium    Acoustic neuroma (H) 07/17/2012     Priority: Medium    Arthropathy 10/04/2011     Priority: Medium   ,   Past Surgical History:   Procedure Laterality Date    COLONOSCOPY      04/05    COLONOSCOPY  07/29/2019    F/U 2029    COLONOSCOPY N/A 7/29/2019    Hyperplastic polyps, 10 year follow up    CRANIOTOMY MIDDLE FOSSA, EXCISE ACOUSTIC NEUROMA, COMBINED Right 08/2012    [facility]    EXCISE MASS FINGER Right 5/31/2019    Procedure: Excision mucous cyst Right Thumb IP Joint;  Surgeon: John Chaparro MD;  Location: [facility] OR    HYSTERECTOMY TOTAL ABDOMINAL, BILATERAL SALPINGO-OOPHORECTOMY, COMBINED      No Comments Provided    and   Social History  "    Tobacco Use    Smoking status: Never     Passive exposure: Past    Smokeless tobacco: Never   Substance Use Topics    Alcohol use: Yes     Comment: Alcoholic Drinks/day: rare     OBJECTIVE:  /88   Pulse 68   Temp 97.5  F (36.4  C) (Temporal)   Resp 16   Ht 1.67 m (5' 5.75\")   Wt 61.2 kg (135 lb)   SpO2 98%   Breastfeeding No   BMI 21.96 kg/m     EXAM:  General Appearance: Pleasant, alert, appropriate appearance for age. No acute distress  Head Exam: Normal. Normocephalic, atraumatic.  Eye Exam: PERRLA, EOMI, conjunctivae, sclerae normal.  Ear Exam: Normal TM's bilaterally. Normal auditory canals and external ears. Non-tender.  Nose Exam: Normal external nose, mucus membranes, and septum.  OroPharynx Exam:  Dental hygiene adequate. Normal buccal mucosa. Normal pharynx.  Neck Exam:  Supple, no masses or nodes. No bruits  Thyroid Exam: No nodules or enlargement.  Chest/Respiratory Exam: Normal chest wall and respirations. Clear to auscultation.  Breast Exam: No dimpling, nipple retraction or discharge. No masses or nodes.  Cardiovascular Exam: Regular rate and rhythm. S1, S2, no murmur, click, gallop, or rubs.  Gastrointestinal Exam: Soft, non-tender, no masses or organomegaly.  Lymphatic Exam: Non-palpable nodes in neck,clavicular, axillary, or inguinal regions.  Musculoskeletal Exam: Back is straight and non-tender, full ROM of upper and lower extremities.  Foot Exam: Left and right foot: good pedal pulses, no lesions, nail hygiene good.   Skin: no rash or abnormalities.  She has scattered actinic keratoses 1 on her left arm and 4 on her chest that we treated with liquid nitrogen.  Neurologic Exam:  normal gross motor, tone coordination and no tremor.  Psychiatric Exam: Alert and oriented - appropriate affect.    Results for orders placed or performed in visit on 12/06/23   Comprehensive metabolic panel     Status: Normal   Result Value Ref Range    Sodium 141 135 - 145 mmol/L    Potassium 4.2 3.4 - " 5.3 mmol/L    Carbon Dioxide (CO2) 26 22 - 29 mmol/L    Anion Gap 10 7 - 15 mmol/L    Urea Nitrogen 13.1 8.0 - 23.0 mg/dL    Creatinine 0.65 0.51 - 0.95 mg/dL    GFR Estimate >90 >60 mL/min/1.73m2    Calcium 10.0 8.8 - 10.2 mg/dL    Chloride 105 98 - 107 mmol/L    Glucose 97 70 - 99 mg/dL    Alkaline Phosphatase 86 40 - 150 U/L    AST 23 0 - 45 U/L    ALT 17 0 - 50 U/L    Protein Total 6.9 6.4 - 8.3 g/dL    Albumin 4.5 3.5 - 5.2 g/dL    Bilirubin Total 1.2 <=1.2 mg/dL   Lipid Profile     Status: Abnormal   Result Value Ref Range    Cholesterol 218 (H) <200 mg/dL    Triglycerides 69 <150 mg/dL    Direct Measure HDL 91 >=50 mg/dL    LDL Cholesterol Calculated 113 (H) <=100 mg/dL    Non HDL Cholesterol 127 <130 mg/dL    Patient Fasting > 8hrs? Unknown     Narrative    Cholesterol  Desirable:  <200 mg/dL    Triglycerides  Normal:  Less than 150 mg/dL  Borderline High:  150-199 mg/dL  High:  200-499 mg/dL  Very High:  Greater than or equal to 500 mg/dL    Direct Measure HDL  Female:  Greater than or equal to 50 mg/dL   Male:  Greater than or equal to 40 mg/dL    LDL Cholesterol  Desirable:  <100mg/dL  Above Desirable:  100-129 mg/dL   Borderline High:  130-159 mg/dL   High:  160-189 mg/dL   Very High:  >= 190 mg/dL    Non HDL Cholesterol  Desirable:  130 mg/dL  Above Desirable:  130-159 mg/dL  Borderline High:  160-189 mg/dL  High:  190-219 mg/dL  Very High:  Greater than or equal to 220 mg/dL   Hemoglobin A1c     Status: Normal   Result Value Ref Range    Hemoglobin A1C 5.5 4.0 - 6.2 %   CBC with platelets and differential     Status: None   Result Value Ref Range    WBC Count 5.6 4.0 - 11.0 10e3/uL    RBC Count 5.03 3.80 - 5.20 10e6/uL    Hemoglobin 14.9 11.7 - 15.7 g/dL    Hematocrit 44.5 35.0 - 47.0 %    MCV 89 78 - 100 fL    MCH 29.6 26.5 - 33.0 pg    MCHC 33.5 31.5 - 36.5 g/dL    RDW 12.7 10.0 - 15.0 %    Platelet Count 252 150 - 450 10e3/uL    % Neutrophils 55 %    % Lymphocytes 34 %    % Monocytes 9 %    %  Eosinophils 2 %    % Basophils 0 %    % Immature Granulocytes 0 %    NRBCs per 100 WBC 0 <1 /100    Absolute Neutrophils 3.1 1.6 - 8.3 10e3/uL    Absolute Lymphocytes 1.9 0.8 - 5.3 10e3/uL    Absolute Monocytes 0.5 0.0 - 1.3 10e3/uL    Absolute Eosinophils 0.1 0.0 - 0.7 10e3/uL    Absolute Basophils 0.0 0.0 - 0.2 10e3/uL    Absolute Immature Granulocytes 0.0 <=0.4 10e3/uL    Absolute NRBCs 0.0 10e3/uL   CBC with Platelets & Differential     Status: None    Narrative    The following orders were created for panel order CBC with Platelets & Differential.  Procedure                               Abnormality         Status                     ---------                               -----------         ------                     CBC with platelets and d...[[id number]]                      Final result                 Please view results for these tests on the individual orders.      ASSESSMENT/Plan :    Melly was seen today for medicare visit.    Diagnoses and all orders for this visit:    Medicare annual wellness visit, subsequent    Acoustic neuroma (H)  -     CBC with Platelets & Differential; Future  -     Comprehensive metabolic panel; Future  -     CBC with Platelets & Differential  -     Comprehensive metabolic panel  -     Cancel: MR Brain w/o & w Contrast; Future    Facial nerve palsy, secondary    Dyslipidemia  -     Comprehensive metabolic panel; Future  -     Lipid Profile; Future  -     Comprehensive metabolic panel  -     Lipid Profile    Insomnia, unspecified type  -     zolpidem (AMBIEN) 5 MG tablet; Take 1 tablet (5 mg) by mouth nightly as needed for sleep    Osteoporosis without current pathological fracture, unspecified osteoporosis type  -     CBC with Platelets & Differential; Future  -     Comprehensive metabolic panel; Future  -     CBC with Platelets & Differential  -     Comprehensive metabolic panel  -     alendronate (FOSAMAX) 70 MG tablet; Take 1 tablet (70 mg) by mouth every 7  "days    Elevated glucose  -     Hemoglobin A1c; Future  -     Hemoglobin A1c    Facial spasm  -     cyclobenzaprine (FLEXERIL) 5 MG tablet; TAKE 1 TO 2 TABLETS BY MOUTH THREE TIMES DAILY AS NEEDED FOR MUSCLE SPASM      Reviewed labs with her.  Renewed Flexeril.    Reviewed her DEXA scan showing osteoporosis for her back and osteopenia of her hips.  Discussed calcium vitamin D and discussed options for treatment.  Will place on Fosamax 70 mg weekly on an empty stomach and discussed how to take it and stay upright.  Plan repeating DEXA scan in 3 years.    Discussion with regard to her insomnia.  Discussed options such as amitriptyline, retrial of mirtazapine, use of something like was ROM which is not covered by her insurance or even just having a few low-dose Ambien on hand to take on a as needed basis.  She will do that then consider using the Flexeril from time to time if need be.  Support and encouragement offered.    A total of 35 minutes was spent with the patient, reviewing records, tests, ordering medications, tests or procedures and documenting clinical information in the EHR in addition to Medicare Wellness.     Armani Guidry MD      Answers submitted by the patient for this visit:  Annual Preventive Visit (Submitted on 11/29/2023)  Chief Complaint: Annual Exam:  In general, how would you rate your overall physical health?: good  Frequency of exercise:: 4-5 days/week  Do you usually eat at least 4 servings of fruit and vegetables a day, include whole grains & fiber, and avoid regularly eating high fat or \"junk\" foods? : Yes  Taking medications regularly:: Not Applicable  Activities of Daily Living: no assistance needed  Home safety: no safety concerns identified  Hearing Impairment:: no hearing concerns  In the past 6 months, have you been bothered by leaking of urine?: No  abdominal pain: No  Blood in stool: No  Blood in urine: No  chest pain: No  chills: No  congestion: No  cough: No  diarrhea: " No  dizziness: Yes  ear pain: No  eye pain: No  nervous/anxious: No  fever: No  frequency: Yes  genital sores: No  headaches: No  hearing loss: No  heartburn: No  arthralgias: No  joint swelling: No  peripheral edema: No  mood changes: Yes  myalgias: No  nausea: No  dysuria: No  palpitations: No  sore throat: No  urgency: No  rash: No  shortness of breath: No  visual disturbance: Yes  weakness: No  pelvic pain: No  vaginal bleeding: No  vaginal discharge: No  tenderness: No  breast mass: No  In general, how would you rate your overall mental or emotional health?: good  Additional concerns today:: No  Exercise outside of work (Submitted on 11/29/2023)  Chief Complaint: Annual Exam:  Duration of exercise:: 15-30 minutes

## 2023-12-13 ENCOUNTER — HOSPITAL ENCOUNTER (OUTPATIENT)
Dept: MRI IMAGING | Facility: OTHER | Age: 68
Discharge: HOME | End: 2023-12-13
Attending: FAMILY MEDICINE | Admitting: FAMILY MEDICINE
Payer: MEDICARE

## 2023-12-13 DIAGNOSIS — D33.3 ACOUSTIC NEUROMA (H): ICD-10-CM

## 2023-12-13 PROCEDURE — 255N000002 HC RX 255 OP 636: Mod: JZ | Performed by: FAMILY MEDICINE

## 2023-12-13 PROCEDURE — G1010 CDSM STANSON: HCPCS

## 2023-12-13 PROCEDURE — A9575 INJ GADOTERATE MEGLUMI 0.1ML: HCPCS | Mod: JZ | Performed by: FAMILY MEDICINE

## 2023-12-13 PROCEDURE — 70543 MRI ORBT/FAC/NCK W/O &W/DYE: CPT | Mod: MG

## 2023-12-13 RX ORDER — GADOTERATE MEGLUMINE 376.9 MG/ML
15 INJECTION INTRAVENOUS ONCE
Status: COMPLETED | OUTPATIENT
Start: 2023-12-13 | End: 2023-12-13

## 2023-12-13 RX ADMIN — GADOTERATE MEGLUMINE 12 ML: 376.9 INJECTION INTRAVENOUS at 16:28

## 2024-07-25 ENCOUNTER — OFFICE VISIT (OUTPATIENT)
Dept: INTERNAL MEDICINE | Facility: OTHER | Age: 69
End: 2024-07-25
Attending: NURSE PRACTITIONER
Payer: COMMERCIAL

## 2024-07-25 VITALS
RESPIRATION RATE: 16 BRPM | SYSTOLIC BLOOD PRESSURE: 158 MMHG | DIASTOLIC BLOOD PRESSURE: 92 MMHG | TEMPERATURE: 97.8 F | WEIGHT: 131.8 LBS | OXYGEN SATURATION: 97 % | HEART RATE: 78 BPM | HEIGHT: 66 IN | BODY MASS INDEX: 21.18 KG/M2

## 2024-07-25 DIAGNOSIS — D33.3 ACOUSTIC NEUROMA (H): ICD-10-CM

## 2024-07-25 DIAGNOSIS — K52.831 COLLAGENOUS COLITIS: ICD-10-CM

## 2024-07-25 DIAGNOSIS — G51.0 FACIAL NERVE PALSY, SECONDARY: ICD-10-CM

## 2024-07-25 DIAGNOSIS — F43.22 ADJUSTMENT DISORDER WITH ANXIOUS MOOD: Primary | ICD-10-CM

## 2024-07-25 DIAGNOSIS — H81.4 VERTIGO OF CENTRAL ORIGIN: ICD-10-CM

## 2024-07-25 DIAGNOSIS — R03.0 ELEVATED BLOOD PRESSURE READING WITHOUT DIAGNOSIS OF HYPERTENSION: ICD-10-CM

## 2024-07-25 DIAGNOSIS — M81.0 OSTEOPOROSIS WITHOUT CURRENT PATHOLOGICAL FRACTURE, UNSPECIFIED OSTEOPOROSIS TYPE: ICD-10-CM

## 2024-07-25 DIAGNOSIS — G47.00 INSOMNIA, UNSPECIFIED TYPE: ICD-10-CM

## 2024-07-25 LAB
TSH SERPL DL<=0.005 MIU/L-ACNC: 2.34 UIU/ML (ref 0.3–4.2)
VIT D+METAB SERPL-MCNC: 47 NG/ML (ref 20–50)

## 2024-07-25 PROCEDURE — G2211 COMPLEX E/M VISIT ADD ON: HCPCS | Performed by: NURSE PRACTITIONER

## 2024-07-25 PROCEDURE — 84443 ASSAY THYROID STIM HORMONE: CPT | Mod: ZL | Performed by: NURSE PRACTITIONER

## 2024-07-25 PROCEDURE — 36415 COLL VENOUS BLD VENIPUNCTURE: CPT | Mod: ZL | Performed by: NURSE PRACTITIONER

## 2024-07-25 PROCEDURE — G0463 HOSPITAL OUTPT CLINIC VISIT: HCPCS

## 2024-07-25 PROCEDURE — 99214 OFFICE O/P EST MOD 30 MIN: CPT | Performed by: NURSE PRACTITIONER

## 2024-07-25 PROCEDURE — 82306 VITAMIN D 25 HYDROXY: CPT | Mod: ZL | Performed by: NURSE PRACTITIONER

## 2024-07-25 RX ORDER — GINGER ROOT/GINGER ROOT EXT 262.5 MG
CAPSULE ORAL
COMMUNITY
Start: 2023-07-05

## 2024-07-25 RX ORDER — CYANOCOBALAMIN (VITAMIN B-12) 500 MCG
LOZENGE ORAL
COMMUNITY
Start: 2024-04-01

## 2024-07-25 RX ORDER — HYDROXYZINE HYDROCHLORIDE 10 MG/1
TABLET, FILM COATED ORAL
Qty: 30 TABLET | Refills: 3 | Status: SHIPPED | OUTPATIENT
Start: 2024-07-25

## 2024-07-25 RX ORDER — ZOLPIDEM TARTRATE 5 MG/1
5 TABLET ORAL
Qty: 30 TABLET | Refills: 5 | Status: SHIPPED | OUTPATIENT
Start: 2024-07-25

## 2024-07-25 ASSESSMENT — PAIN SCALES - GENERAL: PAINLEVEL: NO PAIN (0)

## 2024-07-25 NOTE — PROGRESS NOTES
ICD-10-CM    1. Adjustment disorder with anxious mood  F43.22 hydrOXYzine HCl (ATARAX) 10 MG tablet      2. Osteoporosis without current pathological fracture, unspecified osteoporosis type  M81.0 Vitamin D Deficiency     Vitamin D Deficiency      3. Insomnia, unspecified type  G47.00 zolpidem (AMBIEN) 5 MG tablet     TSH with free T4 reflex     TSH with free T4 reflex      4. Acoustic neuroma (H)  D33.3       5. Facial nerve palsy, secondary  G51.0       6. Vertigo of central origin  H81.4       7. Collagenous colitis  K52.831       8. Elevated blood pressure reading without diagnosis of hypertension  R03.0          Plan:  Establish care visit completed.  Refill of Ambien.  Continue to monitor blood pressure.  Will treat intermittent anxiety.  Start hydroxyzine 10 mg 1-2 tablets up to 3 times daily.  Recommend that she not take this with Ambien.  Potential side effects reviewed and discussed.  Labs today for TSH and vitamin D level for anxiety and osteoporosis.  Calcium levels have all been normal.    The longitudinal plan of care for the diagnosis(es)/condition(s) as documented were addressed during this visit. Due to the added complexity in care, I will continue to support Melly in the subsequent management and with ongoing continuity of care.    Subjective   Melly is a 69 year old, presenting for the following health issues:  Follow Up (medication) and Establish Care      7/25/2024    11:01 AM   Additional Questions   Roomed by Monica BUCK     Here to follow up on osteoporosis and establish care.  On fosamax for past year.  Taking calcium and vitamin D. No recent lab for osteoporosis evaluation.   Blood pressure elevated today.  Usually higher when she comes to the clinic.  She checks it at home and most of the time it is high if she is feeling anxious but normal otherwise.  She has had more problems with anxiety and some depression.  She has tried medication in the past but has been quite sensitive and did  "not tolerate.  She has never tried hydroxyzine for anxiety.  She does take Ambien for sleep.  She needs a refill of that medication.  She is on it for many years for treatment of insomnia.  Past medical history of collagenous colitis which is controlled by homemade Jun.  History of acoustic neuroma with facial palsy and dizziness.    History of Present Illness       Reason for visit:  Meet with new provider    She eats 4 or more servings of fruits and vegetables daily.She consumes 1 sweetened beverage(s) daily.She exercises with enough effort to increase her heart rate 30 to 60 minutes per day.  She exercises with enough effort to increase her heart rate 3 or less days per week.   She is taking medications regularly.                     Objective    BP (!) 158/92 (BP Location: Right arm, Patient Position: Sitting, Cuff Size: Adult Regular)   Pulse 78   Temp 97.8  F (36.6  C) (Tympanic)   Resp 16   Ht 1.664 m (5' 5.5\")   Wt 59.8 kg (131 lb 12.8 oz)   SpO2 97%   BMI 21.60 kg/m    Body mass index is 21.6 kg/m .  Physical Exam   Pleasant female no acute distress.  Affect normal.  Alert and oriented x 4.  Neck supple without adenopathy.  No thyromegaly.  Lung fields clear to auscultation throughout.  Cardiovascular regular rate and rhythm with no murmur or S3.    Dexa 2022 reviewed.            Signed Electronically by: Candie Bhatti NP    "

## 2024-07-25 NOTE — NURSING NOTE
"Chief Complaint   Patient presents with    Follow Up     medication    Establish Care       FOOD SECURITY SCREENING QUESTIONS  Hunger Vital Signs:  Within the past 12 months we worried whether our food would run out before we got money to buy more. Never  Within the past 12 months the food we bought just didn't last and we didn't have money to get more. Never  Monica Samaniego LPN 7/25/2024 11:05 AM      Initial BP (!) 158/92 (BP Location: Right arm, Patient Position: Sitting, Cuff Size: Adult Regular)   Pulse 78   Temp 97.8  F (36.6  C) (Tympanic)   Resp 16   Ht 1.664 m (5' 5.5\")   Wt 59.8 kg (131 lb 12.8 oz)   SpO2 97%   BMI 21.60 kg/m   Estimated body mass index is 21.6 kg/m  as calculated from the following:    Height as of this encounter: 1.664 m (5' 5.5\").    Weight as of this encounter: 59.8 kg (131 lb 12.8 oz).  Medication Reconciliation: complete    Monica Samaniego LPN    "

## 2024-10-08 ENCOUNTER — HOSPITAL ENCOUNTER (OUTPATIENT)
Dept: MAMMOGRAPHY | Facility: OTHER | Age: 69
Discharge: HOME | End: 2024-10-08
Admitting: FAMILY MEDICINE
Payer: MEDICARE

## 2024-10-08 DIAGNOSIS — Z12.31 VISIT FOR SCREENING MAMMOGRAM: ICD-10-CM

## 2024-10-08 PROCEDURE — 77063 BREAST TOMOSYNTHESIS BI: CPT

## 2025-01-11 ENCOUNTER — HEALTH MAINTENANCE LETTER (OUTPATIENT)
Age: 70
End: 2025-01-11

## 2025-02-17 ENCOUNTER — MYC REFILL (OUTPATIENT)
Dept: FAMILY MEDICINE | Facility: OTHER | Age: 70
End: 2025-02-17
Payer: COMMERCIAL

## 2025-02-17 ENCOUNTER — MYC REFILL (OUTPATIENT)
Dept: INTERNAL MEDICINE | Facility: OTHER | Age: 70
End: 2025-02-17
Payer: COMMERCIAL

## 2025-02-17 DIAGNOSIS — G51.39 FACIAL SPASM: ICD-10-CM

## 2025-02-17 DIAGNOSIS — G47.00 INSOMNIA, UNSPECIFIED TYPE: ICD-10-CM

## 2025-02-17 NOTE — TELEPHONE ENCOUNTER
Patient sent Rx request for the following:      Requested Prescriptions   Pending Prescriptions Disp Refills    zolpidem (AMBIEN) 5 MG tablet 30 tablet 5     Sig: Take 1 tablet (5 mg) by mouth nightly as needed for sleep.       There is no refill protocol information for this order          Last Prescription Date:   7/25/24  Last Fill Qty/Refills:         30, R-5    Last Office Visit:              7/25/24   Future Office visit:            None    Unable to complete prescription refill per RN Medication Refill Policy.     Nithin Low RN on 2/17/2025 at 3:53 PM

## 2025-02-18 RX ORDER — ZOLPIDEM TARTRATE 5 MG/1
5 TABLET ORAL
Qty: 30 TABLET | Refills: 4 | Status: SHIPPED | OUTPATIENT
Start: 2025-02-18

## 2025-02-19 RX ORDER — CYCLOBENZAPRINE HCL 5 MG
TABLET ORAL
Qty: 30 TABLET | Refills: 11 | Status: SHIPPED | OUTPATIENT
Start: 2025-02-19

## 2025-02-19 NOTE — TELEPHONE ENCOUNTER
cyclobenzaprine (FLEXERIL) 5 MG tablet         Last Written Prescription Date:  12/6/23  Last Fill Quantity: 30,   # refills: 11  Last Office Visit: 7/25/24 with JAISON and 7/25/24 with CARLYN  Future Office visit:       Routing refill request to provider for review/approval because:  Drug not on the [facility] or [facility] refill protocol or controlled substance. Unable to complete prescription refill per RNMedication Refill Policy.................... Merlyn Clifton RN ....................  2/19/2025   6:31 AM

## 2025-06-11 ENCOUNTER — TELEPHONE (OUTPATIENT)
Dept: OTOLARYNGOLOGY | Facility: CLINIC | Age: 70
End: 2025-06-11
Payer: COMMERCIAL

## 2025-06-11 NOTE — TELEPHONE ENCOUNTER
Spoke with patient to schedule from email/text referral that was sent to Dr. Bess for Lichen plantus. Per CA jennifer to schedule w/ Ondrey w/in next few weeks. Pt is scheduled.  Violeta Parmar on 6/11/2025 at 3:59 PM

## 2025-06-12 NOTE — TELEPHONE ENCOUNTER
FUTURE VISIT INFORMATION:  Appointment Date: 06/24/2025  Appointment Time: 1 PM    REFERRAL INFORMATION:  Referring By:   Referring Clinic:   Reason for Visit/Diagnosis: Lichen plantus, text/email referral (ask CA)      NOTES STATUS DETAILS   OFFICE NOTE from referring provider EPIC 06/11/2025 Telephone Encounter: Spoke with patient to schedule from email/text referral that was sent to Dr. Bess for Lichen plantus.

## 2025-06-23 NOTE — PROGRESS NOTES
Otolaryngology Clinic      Name: Zakiya Hopkins  MRN: [id number]  Age: 70 year old  : 1955  Referring provider: Referred Self  2025      Chief Complaint:  Consultation    History of Present Illness:   Zakiya Hopkins is a 70 year old female with a history of lichen planus who presents for consultation regarding treatment. Last seen by ENT with Dr. Metcalf on 3/21/2018. She was referred by Dr. Bess to establish care with the clinic. Today she reports that she noticed white spots and pain on her tongue. She has a history of thrush. In April, she went to a doctor and was prescribed a statin but it didn't work for her. She also reports seeing red spots on her tongue that were also painful. Salt water gargle helped with the pain. States that coffee and spicy foods made the pain worse. Patient denied trying any other treatments. .     Active Medications:     Current Outpatient Medications:     Calcium Carb-Cholecalciferol (CALTRATE 600+D3) 600-20 MG-MCG TABS, , Disp: , Rfl:     Cholecalciferol (VITAMIN D3) 1000 UNITS CAPS, Take 2 capsules by mouth daily, Disp: , Rfl:     ciclopirox (LOPROX) 0.77 % cream, APPLY TO TOENAILS ONCE DAILY FOR THREE MONTHS, THEN ONCE WEEKLY AS A PREVENTATIVE, Disp: , Rfl:     cyclobenzaprine (FLEXERIL) 5 MG tablet, TAKE 1 TO 2 TABLETS BY MOUTH THREE TIMES DAILY AS NEEDED FOR MUSCLE SPASM, Disp: 30 tablet, Rfl: 11    magic mouthwash (ENTER INGREDIENTS IN COMMENTS) suspension, Swish and spit 5 ml every 8 hours for 14 days, Disp: 1000 mL, Rfl: 3    vitamin E 400 units TABS, , Disp: , Rfl:     zolpidem (AMBIEN) 5 MG tablet, Take 1 tablet (5 mg) by mouth nightly as needed for sleep., Disp: 30 tablet, Rfl: 4    alendronate (FOSAMAX) 70 MG tablet, Take 1 tablet (70 mg) by mouth every 7 days, Disp: 12 tablet, Rfl: 11    hydrOXYzine HCl (ATARAX) 10 MG tablet, 1-2 tablets three times daily, Disp: 30 tablet, Rfl: 3      Allergies:   Hydromorphone, Oxycodone, Erythromycin, and  Meperidine      Past Medical History:  Past Medical History:   Diagnosis Date    Bell's palsy     8/12,Postoperative complication after acoustic neuroma resection    Other shoulder lesions, left shoulder     No Comments Provided    Other specified congenital malformations of intestine     04/05    Personal history of other medical treatment (CODE)     age 11     Patient Active Problem List   Diagnosis    Acoustic neuroma (H)    Facial nerve palsy, secondary    Arthropathy    C6 radiculopathy    Insomnia    Lesion of plantar nerve    Vertigo of central origin    Collagenous colitis    Sigmoid diverticulosis    Tick bite    Osteoporosis without current pathological fracture, unspecified osteoporosis type        Past Surgical History:  Past Surgical History:   Procedure Laterality Date    COLONOSCOPY      04/05    COLONOSCOPY  07/29/2019    F/U 2029    COLONOSCOPY N/A 7/29/2019    Hyperplastic polyps, 10 year follow up    CRANIOTOMY MIDDLE FOSSA, EXCISE ACOUSTIC NEUROMA, COMBINED Right 08/2012    [facility]    EXCISE MASS FINGER Right 5/31/2019    Procedure: Excision mucous cyst Right Thumb IP Joint;  Surgeon: John Chaparro MD;  Location: [facility] OR    HYSTERECTOMY TOTAL ABDOMINAL, BILATERAL SALPINGO-OOPHORECTOMY, COMBINED      No Comments Provided       Family History:   Family History   Problem Relation Age of Onset    Other - See Comments Mother         Chemical dependency/Alzheimers    Hypertension Father         Hypertension    Diabetes Father         Diabetes    Other - See Comments Father         Chemical depedency/Stroke    Heart Disease Father         Heart Disease,angioplasty    Breast Cancer Maternal Grandmother         Cancer-breast    Heart Disease Maternal Grandmother         Heart Disease,CAD    Breast Cancer Sister         Cancer-breast    Other - See Comments Sister         Bipolar    Substance Abuse Brother         Alcohol/Drug,Alcoholic         Social History:   Social History     Tobacco  "Use    Smoking status: Never     Passive exposure: Past    Smokeless tobacco: Never   Vaping Use    Vaping status: Never Used   Substance Use Topics    Alcohol use: Yes     Comment: Alcoholic Drinks/day: rare    Drug use: No       Review of Systems:   Pertinent items are noted in HPI or as in patient entered ROS below, remainder of complete ROS is negative.       6/19/2025    11:42 AM    ENT ROS   Constitutional Problems with sleep   Gastrointestinal/Genitourinary Constipation         Physical Exam:   BP (!) 193/75   Pulse 66   Ht 1.664 m (5' 5.5\")   Wt 58.5 kg (128 lb 14.4 oz)   SpO2 98%   BMI 21.12 kg/m       Constitutional:  The patient was unaccompanied, well-groomed, and in no acute distress.    Skin:  Warm and pink.    Neurologic:  Alert and oriented x 3.  CN's III-XII within normal limits.  Voice normal.   Psychiatric:  The patient's affect was calm, cooperative, and appropriate.    Respiratory:  Breathing comfortably without stridor or exertion of accessory muscles.    Eyes: Extraocular movement intact.    Head:  Normocephalic and atraumatic.  No lesions or scars.    Ears:  Pinnae and tragus non-tender.  EAC's and TM's were clear.   Nose:  Sinuses were non-tender.  Anterior rhinoscopy revealed midline septum and absence of purulence or polyps.    OC/OP:  Normal tongue, floor of mouth, buccal mucosa, and palate.  No lesions or masses on inspection or palpation.  No abnormal lymph tissue in the oropharynx.  The pterygoid region is non-tender.  The top of the tongue looks like dense papillae but more like geographic tongue.  Neck:  Supple with normal laryngeal and tracheal landmarks.  The parotid beds were without masses.  No palpable thyroid.  Lymphatic:  There is no palpable lymphadenopathy in the neck.     Assessment and Plan:    ICD-10-CM    1. 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 On the tongue looks like dense geographic tongue so I'm prescribing magic mouthwash and she'll come back in about 4 weeks.    Follow-up: 4 weeks         Scribe Disclosure:  IZuleima, am serving as a scribe to document services personally performed by Koby Lutz MD at this visit, based upon the provider's statements to me. All documentation has been reviewed by the aforementioned provider prior to being entered into the official medical record.     Scribe Preparation Attestation:  Zuleima LLAMAS, a scribe, prepared the chart for today's encounter.

## 2025-06-24 ENCOUNTER — OFFICE VISIT (OUTPATIENT)
Dept: OTOLARYNGOLOGY | Facility: CLINIC | Age: 70
End: 2025-06-24
Payer: COMMERCIAL

## 2025-06-24 ENCOUNTER — PRE VISIT (OUTPATIENT)
Dept: OTOLARYNGOLOGY | Facility: CLINIC | Age: 70
End: 2025-06-24

## 2025-06-24 VITALS
BODY MASS INDEX: 20.72 KG/M2 | WEIGHT: 128.9 LBS | HEIGHT: 66 IN | DIASTOLIC BLOOD PRESSURE: 75 MMHG | OXYGEN SATURATION: 98 % | SYSTOLIC BLOOD PRESSURE: 193 MMHG | HEART RATE: 66 BPM

## 2025-06-24 DIAGNOSIS — K14.1 GEOGRAPHIC TONGUE: Primary | ICD-10-CM

## 2025-06-24 PROCEDURE — 1126F AMNT PAIN NOTED NONE PRSNT: CPT | Performed by: OTOLARYNGOLOGY

## 2025-06-24 PROCEDURE — 3077F SYST BP >= 140 MM HG: CPT | Performed by: OTOLARYNGOLOGY

## 2025-06-24 PROCEDURE — 99204 OFFICE O/P NEW MOD 45 MIN: CPT | Performed by: OTOLARYNGOLOGY

## 2025-06-24 PROCEDURE — 3078F DIAST BP <80 MM HG: CPT | Performed by: OTOLARYNGOLOGY

## 2025-06-24 ASSESSMENT — PAIN SCALES - GENERAL: PAINLEVEL_OUTOF10: NO PAIN (0)

## 2025-06-24 NOTE — LETTER
2025       RE: Zakiya Hopkins  [address] [address] [address]     Dear Colleague,    Thank you for referring your patient, Zakiya Hopkins, to the [facility] EAR NOSE AND THROAT CLINIC [place] at [facility]. Please see a copy of my visit note below.      Otolaryngology Clinic      Name: Zakiya Hopkins  MRN: [id number]  Age: 70 year old  : 1955  Referring provider: Referred Self  2025      Chief Complaint:  Consultation    History of Present Illness:   Zakiya Hopkins is a 70 year old female with a history of lichen planus who presents for consultation regarding treatment. Last seen by ENT with Dr. Metcalf on 3/21/2018. She was referred by Dr. Bess to establish care with the clinic. Today she reports that she noticed white spots and pain on her tongue. She has a history of thrush. In April, she went to a doctor and was prescribed a statin but it didn't work for her. She also reports seeing red spots on her tongue that were also painful. Salt water gargle helped with the pain. States that coffee and spicy foods made the pain worse. Patient denied trying any other treatments. .     Active Medications:     Current Outpatient Medications:      Calcium Carb-Cholecalciferol (CALTRATE 600+D3) 600-20 MG-MCG TABS, , Disp: , Rfl:      Cholecalciferol (VITAMIN D3) 1000 UNITS CAPS, Take 2 capsules by mouth daily, Disp: , Rfl:      ciclopirox (LOPROX) 0.77 % cream, APPLY TO TOENAILS ONCE DAILY FOR THREE MONTHS, THEN ONCE WEEKLY AS A PREVENTATIVE, Disp: , Rfl:      cyclobenzaprine (FLEXERIL) 5 MG tablet, TAKE 1 TO 2 TABLETS BY MOUTH THREE TIMES DAILY AS NEEDED FOR MUSCLE SPASM, Disp: 30 tablet, Rfl: 11     magic mouthwash (ENTER INGREDIENTS IN COMMENTS) suspension, Swish and spit 5 ml every 8 hours for 14 days, Disp: 1000 mL, Rfl: 3     vitamin E 400 units TABS, , Disp: , Rfl:      zolpidem (AMBIEN) 5 MG tablet, Take 1 tablet (5  mg) by mouth nightly as needed for sleep., Disp: 30 tablet, Rfl: 4     alendronate (FOSAMAX) 70 MG tablet, Take 1 tablet (70 mg) by mouth every 7 days, Disp: 12 tablet, Rfl: 11     hydrOXYzine HCl (ATARAX) 10 MG tablet, 1-2 tablets three times daily, Disp: 30 tablet, Rfl: 3      Allergies:   Hydromorphone, Oxycodone, Erythromycin, and Meperidine      Past Medical History:  Past Medical History:   Diagnosis Date     Bell's palsy     8/12,Postoperative complication after acoustic neuroma resection     Other shoulder lesions, left shoulder     No Comments Provided     Other specified congenital malformations of intestine     04/05     Personal history of other medical treatment (CODE)     age 11     Patient Active Problem List   Diagnosis     Acoustic neuroma (H)     Facial nerve palsy, secondary     Arthropathy     C6 radiculopathy     Insomnia     Lesion of plantar nerve     Vertigo of central origin     Collagenous colitis     Sigmoid diverticulosis     Tick bite     Osteoporosis without current pathological fracture, unspecified osteoporosis type        Past Surgical History:  Past Surgical History:   Procedure Laterality Date     COLONOSCOPY      04/05     COLONOSCOPY  07/29/2019    F/U 2029     COLONOSCOPY N/A 7/29/2019    Hyperplastic polyps, 10 year follow up     CRANIOTOMY MIDDLE FOSSA, EXCISE ACOUSTIC NEUROMA, COMBINED Right 08/2012    [facility]     EXCISE MASS FINGER Right 5/31/2019    Procedure: Excision mucous cyst Right Thumb IP Joint;  Surgeon: John Chaparro MD;  Location: [facility] OR     HYSTERECTOMY TOTAL ABDOMINAL, BILATERAL SALPINGO-OOPHORECTOMY, COMBINED      No Comments Provided       Family History:   Family History   Problem Relation Age of Onset     Other - See Comments Mother         Chemical dependency/Alzheimers     Hypertension Father         Hypertension     Diabetes Father         Diabetes     Other - See Comments Father         Chemical depedency/Stroke     Heart Disease Father  "        Heart Disease,angioplasty     Breast Cancer Maternal Grandmother         Cancer-breast     Heart Disease Maternal Grandmother         Heart Disease,CAD     Breast Cancer Sister         Cancer-breast     Other - See Comments Sister         Bipolar     Substance Abuse Brother         Alcohol/Drug,Alcoholic         Social History:   Social History     Tobacco Use     Smoking status: Never     Passive exposure: Past     Smokeless tobacco: Never   Vaping Use     Vaping status: Never Used   Substance Use Topics     Alcohol use: Yes     Comment: Alcoholic Drinks/day: rare     Drug use: No       Review of Systems:   Pertinent items are noted in HPI or as in patient entered ROS below, remainder of complete ROS is negative.       6/19/2025    11:42 AM    ENT ROS   Constitutional Problems with sleep   Gastrointestinal/Genitourinary Constipation         Physical Exam:   BP (!) 193/75   Pulse 66   Ht 1.664 m (5' 5.5\")   Wt 58.5 kg (128 lb 14.4 oz)   SpO2 98%   BMI 21.12 kg/m       Constitutional:  The patient was unaccompanied, well-groomed, and in no acute distress.    Skin:  Warm and pink.    Neurologic:  Alert and oriented x 3.  CN's III-XII within normal limits.  Voice normal.   Psychiatric:  The patient's affect was calm, cooperative, and appropriate.    Respiratory:  Breathing comfortably without stridor or exertion of accessory muscles.    Eyes: Extraocular movement intact.    Head:  Normocephalic and atraumatic.  No lesions or scars.    Ears:  Pinnae and tragus non-tender.  EAC's and TM's were clear.   Nose:  Sinuses were non-tender.  Anterior rhinoscopy revealed midline septum and absence of purulence or polyps.    OC/OP:  Normal tongue, floor of mouth, buccal mucosa, and palate.  No lesions or masses on inspection or palpation.  No abnormal lymph tissue in the oropharynx.  The pterygoid region is non-tender.  The top of the tongue looks like dense papillae but more like geographic tongue.  Neck:  Supple " with normal laryngeal and tracheal landmarks.  The parotid beds were without masses.  No palpable thyroid.  Lymphatic:  There is no palpable lymphadenopathy in the neck.     Assessment and Plan:    ICD-10-CM    1. Geographic tongue  K14.1 magic mouthwash (ENTER INGREDIENTS IN COMMENTS) suspension         Zakiya Hopkins is a 70 year old female with a history of lichen planus who presents for consultation regarding treatment. On the tongue looks like dense geographic tongue so I'm prescribing magic mouthwash and she'll come back in about 4 weeks.    Follow-up: 4 weeks         Scribe Disclosure:  Zuleima LLAMAS, am serving as a scribe to document services personally performed by Koby Lutz MD at this visit, based upon the provider's statements to me. All documentation has been reviewed by the aforementioned provider prior to being entered into the official medical record.     Scribe Preparation Attestation:  Zuleima LLAMAS, a scribe, prepared the chart for today's encounter.      Again, thank you for allowing me to participate in the care of your patient.      Sincerely,    Koby Lutz MD

## 2025-07-22 ENCOUNTER — VIRTUAL VISIT (OUTPATIENT)
Dept: OTOLARYNGOLOGY | Facility: CLINIC | Age: 70
End: 2025-07-22
Payer: COMMERCIAL

## 2025-07-22 DIAGNOSIS — K14.1 GEOGRAPHIC TONGUE: Primary | ICD-10-CM

## 2025-07-22 RX ORDER — DEXAMETHASONE 0.5 MG/5ML
1 SOLUTION ORAL 2 TIMES DAILY
Qty: 500 ML | Refills: 1 | Status: SHIPPED | OUTPATIENT
Start: 2025-07-22

## 2025-07-22 NOTE — LETTER
2025       RE: Zakiya Hopkins  [address] [address] [address]     Dear Colleague,    Thank you for referring your patient, Zakiya Hopkins, to the [facility] EAR NOSE AND THROAT CLINIC [place] at [facility]. Please see a copy of my visit note below.      Otolaryngology Clinic    Name: Zakiya Hopkins  MRN: [id number]  Age: 70 year old  : [birth year]25      Chief Complaint:   Follow up - Telehealth  Start Time: 9:36 AM  End Time: 9:39 AM    History of Present Illness:   Zakiya Hopkins is a 70 year old female with a history of  lichen planus who presents for follow up. Last seen in ENT by me on 2025. At that time, she was prescribed magic mouthwash.    Today, she reports that she's palliating by this but it has helped some.       Review of Systems:   Pertinent items are noted in HPI or as in patient entered ROS below, remainder of complete ROS is negative.       2025     3:20 PM    ENT ROS   Gastrointestinal/Genitourinary Constipation        Assessment and Plan:  No diagnosis found.  Zakiya Hopkins is a 70 year old female with a history of  lichen planus who presents for follow up. Prescribed dexamethasone mouthwash to use for a month as she lives far away from here.    Follow-up: 1 month         Scribe Disclosure:  I, Zuleima Riddle, am serving as a scribe to document services personally performed by Koby Lutz MD at this visit, based upon the provider's statements to me. All documentation has been reviewed by the aforementioned provider prior to being entered into the official medical record.    Again, thank you for allowing me to participate in the care of your patient.      Sincerely,    Koby Lutz MD

## 2025-07-22 NOTE — PROGRESS NOTES
Otolaryngology Clinic    Name: Zakiya Hopkins  MRN: [id number]  Age: 70 year old  : [birth year]25      Chief Complaint:   Follow up - Telehealth  Start Time: 9:36 AM  End Time: 9:39 AM    History of Present Illness:   Zakiya Hopkins is a 70 year old female with a history of  lichen planus who presents for follow up. Last seen in ENT by me on 2025. At that time, she was prescribed magic mouthwash.    Today, she reports that she's palliating by this but it has helped some.       Review of Systems:   Pertinent items are noted in HPI or as in patient entered ROS below, remainder of complete ROS is negative.       2025     3:20 PM    ENT ROS   Gastrointestinal/Genitourinary Constipation        Assessment and Plan:  No diagnosis found.  Zakiya Hopkins is a 70 year old female with a history of  lichen planus who presents for follow up. Prescribed dexamethasone mouthwash to use for a month as she lives far away from here.    Follow-up: 1 month         Scribe Disclosure:  Zuleima LLAMAS, am serving as a scribe to document services personally performed by Koby Lutz MD at this visit, based upon the provider's statements to me. All documentation has been reviewed by the aforementioned provider prior to being entered into the official medical record.

## 2025-08-19 ENCOUNTER — VIRTUAL VISIT (OUTPATIENT)
Dept: OTOLARYNGOLOGY | Facility: CLINIC | Age: 70
End: 2025-08-19
Payer: COMMERCIAL

## 2025-08-19 DIAGNOSIS — R21 RASH AND NONSPECIFIC SKIN ERUPTION: Primary | ICD-10-CM

## 2025-08-19 RX ORDER — CLOBETASOL PROPIONATE 0.5 MG/G
OINTMENT TOPICAL 2 TIMES DAILY
Qty: 15 G | Refills: 1 | Status: SHIPPED | OUTPATIENT
Start: 2025-08-19

## 2025-08-26 DIAGNOSIS — K14.1 GEOGRAPHIC TONGUE: ICD-10-CM

## 2025-08-26 RX ORDER — DEXAMETHASONE 0.5 MG/5ML
1 SOLUTION ORAL 2 TIMES DAILY
Qty: 500 ML | Refills: 1 | Status: SHIPPED | OUTPATIENT
Start: 2025-08-26

## (undated) DEVICE — PREP CHLORAPREP 26ML TINTED ORANGE  260815

## (undated) DEVICE — ENDO FORCEP ENDOJAW BIOPSY 2.8MMX230CM FB-220U

## (undated) DEVICE — SUCTION MANIFOLD NEPTUNE 2 SYS 4 PORT 0702-020-000

## (undated) DEVICE — TUBING SUCTION 10'X3/16" N510

## (undated) DEVICE — GLOVE BIOGEL PI INDICATOR 8.0 LF 41680

## (undated) DEVICE — ESU GROUND PAD ADULT W/CORD E7507

## (undated) DEVICE — ESU NDL COLORADO MICRO E1651

## (undated) DEVICE — DRSG GAUZE 4X4" 3033

## (undated) DEVICE — SOL WATER 1500ML

## (undated) DEVICE — ENDO BRUSH CHANNEL MASTER CLEANING 2-4.2MM BW-412T

## (undated) DEVICE — PACK MAJOR EXTREMITY SOP15MEFCA

## (undated) DEVICE — COVER LIGHT HANDLE LT-F02

## (undated) DEVICE — SU ETHILON 4-0 FS-2 18" 662G

## (undated) DEVICE — GLOVE PROTEXIS POWDER FREE SMT 8.0  2D72PT80X

## (undated) DEVICE — ENDO KIT COMPLIANCE DYKENDOCMPLY

## (undated) DEVICE — DRSG XEROFORM 1X8"

## (undated) DEVICE — DRSG KERLIX 2 1/4"X3YDS ROLL 6720

## (undated) DEVICE — DRAPE STERI TOWEL LG 1010

## (undated) DEVICE — TOURNIQUET SGL BLADDER 18"X4" RED 5921-218-135

## (undated) RX ORDER — KETOROLAC TROMETHAMINE 30 MG/ML
INJECTION, SOLUTION INTRAMUSCULAR; INTRAVENOUS
Status: DISPENSED
Start: 2019-05-31

## (undated) RX ORDER — LIDOCAINE HYDROCHLORIDE 20 MG/ML
INJECTION, SOLUTION EPIDURAL; INFILTRATION; INTRACAUDAL; PERINEURAL
Status: DISPENSED
Start: 2019-05-31

## (undated) RX ORDER — FENTANYL CITRATE 50 UG/ML
INJECTION, SOLUTION INTRAMUSCULAR; INTRAVENOUS
Status: DISPENSED
Start: 2019-05-31

## (undated) RX ORDER — LIDOCAINE HYDROCHLORIDE 10 MG/ML
INJECTION, SOLUTION INFILTRATION; PERINEURAL
Status: DISPENSED
Start: 2019-05-31

## (undated) RX ORDER — CEFAZOLIN SODIUM 2 G/100ML
INJECTION, SOLUTION INTRAVENOUS
Status: DISPENSED
Start: 2019-05-31

## (undated) RX ORDER — ONDANSETRON 2 MG/ML
INJECTION INTRAMUSCULAR; INTRAVENOUS
Status: DISPENSED
Start: 2019-05-31

## (undated) RX ORDER — PROPOFOL 10 MG/ML
INJECTION, EMULSION INTRAVENOUS
Status: DISPENSED
Start: 2019-05-31

## (undated) RX ORDER — KETAMINE HYDROCHLORIDE 50 MG/ML
INJECTION, SOLUTION INTRAMUSCULAR; INTRAVENOUS
Status: DISPENSED
Start: 2019-05-31

## (undated) RX ORDER — LIDOCAINE HYDROCHLORIDE 20 MG/ML
INJECTION, SOLUTION EPIDURAL; INFILTRATION; INTRACAUDAL; PERINEURAL
Status: DISPENSED
Start: 2019-07-29

## (undated) RX ORDER — PROPOFOL 10 MG/ML
INJECTION, EMULSION INTRAVENOUS
Status: DISPENSED
Start: 2019-07-29

## (undated) RX ORDER — BUPIVACAINE HYDROCHLORIDE 2.5 MG/ML
INJECTION, SOLUTION EPIDURAL; INFILTRATION; INTRACAUDAL
Status: DISPENSED
Start: 2019-05-31